# Patient Record
Sex: MALE | Race: WHITE | NOT HISPANIC OR LATINO | Employment: FULL TIME | URBAN - METROPOLITAN AREA
[De-identification: names, ages, dates, MRNs, and addresses within clinical notes are randomized per-mention and may not be internally consistent; named-entity substitution may affect disease eponyms.]

---

## 2024-01-19 ENCOUNTER — TELEPHONE (OUTPATIENT)
Dept: TRANSPLANT | Facility: CLINIC | Age: 64
End: 2024-01-19

## 2024-01-19 ENCOUNTER — DOCUMENTATION ONLY (OUTPATIENT)
Dept: TRANSPLANT | Facility: CLINIC | Age: 64
End: 2024-01-19
Payer: COMMERCIAL

## 2024-01-19 NOTE — TELEPHONE ENCOUNTER
"Voucher: Wants More Info Registered As: Standard Voucher Donor  Donor Intake Start: 23 Donor Intake Complete: 23  Gender: Male Preferred Language: English  Full Name: Angel Payne Is  Needed: [not answered]  E-mail: [email or web address] Phone Number: [phone]  Secondary Phone: [phone]  Contact Preference: [not answered] Best Contact Time: 9am - 5pm  Emergency Contact: Veena Elmer Emergency Contact #: [phone]  Relationship to Contact: Contact is my spouse  : [birth year] Age: 63  Address: [address] [place] [address] City: [place]  State: [place] Postal Code: [id number]  Height: 6'3\" Weight: 200lbs  BMI: 25  Employment Status: Employed Has PTO for donation? Yes, using vacation  Occupation: Inpector Requires Heavy Lifting? No  Education Level: High School Marital Status:   Exercise Routine: Occasional Health Insurance: Yes  Blood Type: O Ethnicity/Race: White  Donor Type: Standard Voucher Donor  Prefer Remote Donation: [not answered]  Physician: Dr. Bowling<br/>CRYS Jhaveri  Donating for Local Recipient  Recipient's Name: Shazia Corley Recipient's : [phone]  Recipient's Status: Patient not on dialysis but  needs a transplant soon.  How Candidate Knows Recip: Friend  Candidate communicates w/  Recip: Less Than Once A Month  Possible Interest In:  Motivation to donate: I know the recipient  Living Donor Pre-Screening  Is In [place]  Will accept blood transfusions? Yes  Has been diagnosed with kidney disease? No  Has had a heart attack? No  Has Diabetes (High BGs)? No  Has had cancer? No  Has had kidney stones? No  Has Used Tobacco? Yes   - currently uses tobacco: Yes   - will stop for surgery: Yes   - how many years: 43   - tobacco use (packs/cans) / frequency: 3 / Weekly  Has HIV? No  Is Currently Incarcerated? No  Is Currently Residing in [place] Yes  History Misc  Has Allergies? No  Has had Surgeries? Yes  Surgery When  Partial knee replacement   Partial knee replacement " 2012  Knee surgeries 3 times 2008  Takes Medication? Yes  Medication Dose Frequency  Ibuprofen 600 mg About once a week  Medical History  History of High BP? Never  History of CABG (bypass surgery)? No  History of blood clots? Never  History of coronary disease? Never  History of high cholesterol or triglycerides? Never  Has stents implanted? No  History of chest pain during exercise? No  History of chest pain at other times? No  Results of climbing 2 flights of stairs? No Problem  Had stress test in last year? No  Has had stroke? No  Has had leg bypass? No  History of lung disease? Never  History of COPD? Never  History of TB? Never  History of Pneumonia? Never  Has respiratory issues? No  Has HIV? No  Has Gastro Issues? No  History of Gallstones? Never  History of Pancreatitis? Never  History of Liver Disease? Never  History of Hepatitis B? Never  History of Hepatitis C? Never  History of bleeding problems? Never  History of UTIs? No  History of kidney damage? Never  History of Proteinuria? Never  History of Hematuria? Never  History of neuro disease? Never  History of seizure? Never  History of lupus? Never  History of paralysis? Never  History of arthritis? Treated in past  History of neuropathy? Never  History of depression? Never  History of anxiety? Never  History of documented psychiatric illness? Never  Has had transfusions? Unknown  History of Obesity? No  History of Fabry's Disease? No  History of Sickle Cell Disease? No  History of Sickle Cell Trait? No  History of Sarcoidosis? No  Has auto-immune disease? No  Has had Physical Exam? Yes   - how many years ago: 3  Has had PSA Test? No  Colonoscopy? Yes   - how many years ago: 5  Medical history comments? Both knees partial replacement  Living Donor Family Medical History  Anyone with kidney disease? No  Anyone with liver disease? No  Anyone with heart disease? Yes   - which family members: Mother and Father  Anyone with coronary artery disease? Yes   -  which family members: Mother and Father  Anyone with high blood pressure? Yes   - which family members: Mother  Anyone with blood disorder? No  Anyone with cancer? Yes   - which family members: Mother  Anyone with kidney cancer? No  Anyone with diabetes? No  Is mother alive? No  Mother's age? 68  Mother's cause of death? Lung cancer  Is father alive? No  Father's age? 58  Father's cause of death? Heart attack  How many siblings? 2  How many adult children? 2  How many children under 18? 0  Social History  Has Used Alcohol? Yes   - currently uses alcohol: Yes   - how much: 3/Weekly  Has Abused Alcohol? No  Has Used Drugs? Yes  Drugs Used Last Used Got Treatment?  Marijuana Current (less than 1yr) No  Has had legal issues w/ law enforcement? Yes  Has been sentenced to senior living? No  Traveled over 100 miles from home in last year? Yes   - Traveled Where? [place], [place], [place]  and all around [place]  Has had suicidal thoughts or attempts in the last five years? No

## 2024-01-23 ENCOUNTER — TELEPHONE (OUTPATIENT)
Dept: TRANSPLANT | Facility: CLINIC | Age: 64
End: 2024-01-23
Payer: COMMERCIAL

## 2024-01-23 NOTE — TELEPHONE ENCOUNTER
Initial Independent Living Donor Advocate contact made with potential donor today.  I introduced myself and my role during the donation process, including:   KATIE ROLE   The federal government requires that all licensed transplant centers provide the living donor with an Independent Living Donor Advocate (KATIE).  I do not meet recipients or attend meetings that discuss their care or decision to transplant them. My role is separate to avoid any conflict of interest.  My role is to ensure:  1) your rights are protected;  2) you get all the information you need from the transplant team to make a fully informed decision whether to donate;   3) that living donation is in your best interest.   4) that you have the right to decide NOT to go forward with living donation at any time during this process.  I am available to you throughout the workup, during surgery phase and follow-up at home.     WORKUP & PRIVACY   Your identity and workup are not shared with the recipient at any time.   The recipient's insurance covers the medical expenses related to the donor evaluation and surgery.  However, it is important that you carry your own health insurance to address any medical issues that are found and are NOT related to living donation.  Additionally, age appropriate cancer screening (I.e. mammograms,  colonoscopies, etc) is required and would be through your insurance.  There is a psychosocial and medical donor workup that consists of testing to determine if you are healthy enough to donate. Workup tests include tissue typing/genetics, many blood draws, urine collection/ (kidney function testing), chest x-ray, EKG/other heart testing, CT scan. Age appropriate cancer screening is required and would be through your insurance. As you complete each step then you may move on to the next.  Workup can take as little or as long as you need and you can stop the process at any time. Transplant is a treatment option, not a cure. A kidney  from a living kidney donor can last 12-14 years.  Other treatment options are  donation and two types of dialysis.   This is major surgery and your estimated hospital stay is approximately 1-2 nights.  After surgery, there are driving and lifting restrictions - no driving for two weeks and no lifting over ten pounds for 8 weeks.  Donors are routinely off from work for 4 - 6 weeks after surgery, and potentially longer if they have a physical job.     If you anticipate lost wages due to donation, donor wage reimbursement options may be available to you and will be reviewed with you during the evaluation process. Donor Shield and NLDAC explained.  We reviewed the importance of completing follow-up labs and surveys at six months, 1 year and 2 years after donation to monitor kidney health and the impact donation has had on their life post donation.        QUESTIONS    Have you received the Welcome e-mail that includes copies of the informed consent, financial letter, information on donor shield and NLDAC from the transplant department? Yes.    Have you discussed with anyone your potential decision to donate?   Yes. Spouse and work     Is anyone pressuring or coercing you to donate? No.    Have you discussed any financial arrangements with recipient around donating a kidney? No.    Are you aware that you can confidentially opt out at any time, up to and including day of donation? Yes.    At this time, would you like to proceed with the medical evaluation to see if you can be a kidney donor?  Yes.    If yes, I will make an appointment for your donor coordinator to reach out to you with next steps.     Contact information for KATIE's was provided Yes.    Shazia Madison- [phone]  Ingrid Law-  [phone]    Confirmed that he reviewed Informed consent document and all questions answered.  Reviewed that they will receive Docusign to obtain electronic signature for the following: Informed consent, SRTR data, YAJAIRA  "for medical information, Auth for Electronic communication, Kidney for Life and will need their signed consent back before proceeding with evaluation.     Time frame for donation: open  Paired exchange was introduced  Yes.      MyChart was initiated  Yes.  CareEverywhere was initiated No.    KATIE NOTES: Vimal wants to donate a kidney to a his friend, who was his boss at one time. Lives [place] with his wife. 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 Has health insurance through his wife. He wants our team to know that he has artificial knees and is wondering if that will impact imaging. He also mentioned tht his \"teeth are in bad shape\"-- he is in the process of getting dentures. He mentioned that his Dad had a heart transplant and wants to pay it forward to this friend. He does drink alcohol-- every other weekend, a few drinks at a time. Also sporadically drinks a THC seltzer. Vimal smokes cigarettes. About 8-10 per day. He states that from the ages of [age] before joining the [employer], he was using cocaine, speed, and weed. No other recent illicit drug use. Scheduled to speak to Willow Chawla 1/30 at 9am.     Ingrid Law, [facility], [facility]   Independent Living Donor Advocate  [facility] [place], [facility]  Direct: [phone]  E-Mail: [email or web address]        Duration of call 30 minutes   "

## 2024-01-30 ENCOUNTER — DOCUMENTATION ONLY (OUTPATIENT)
Dept: TRANSPLANT | Facility: CLINIC | Age: 64
End: 2024-01-30
Payer: COMMERCIAL

## 2024-01-30 ENCOUNTER — TELEPHONE (OUTPATIENT)
Dept: TRANSPLANT | Facility: CLINIC | Age: 64
End: 2024-01-30
Payer: COMMERCIAL

## 2024-01-30 DIAGNOSIS — Z00.5 TRANSPLANT DONOR EVALUATION: Primary | ICD-10-CM

## 2024-01-30 NOTE — TELEPHONE ENCOUNTER
Contacted Angel Payne to introduce myself and my role, review of medical/surgical/family history and next steps.     Angel Payne  is aware He can stop donor evaluation at any time.    Regular blood donor? No Last blood donation date  (Notified donor to avoid blood donation at this time to get accurate blood counts if going through evaluation)     Angel Payne is a 63 year old male  ABO O that would like to learn more about PEP. Open to paired exchange: PEP     Concerns from medical/surgical/family history: mom  from lung Ca.  Dad  post heart transplant    Current medications and NSAID use: Does use Advil but will change to Tylenol    Allergies reviewed: NKA    Legal issues w/ law enforcement: no    Reviewed any history of travel in endemic areas: [place], [place].  2019  [place]  Strongyloides- [place].  Trypanosoma cruzi ([place])- [place]  West Nile Virus- [place], [place], [place], [place] and [place]. (Included in ANDRÉS testing prior to donation)    Per our Phase 1 algorithm, does meet criteria to do preliminary testing. Social work screening no.      Verified that potential donor was provided the informed consent DocuSign and they are comfortable moving forward to living donor evaluation.    Reviewed evaluation testing: Iohexol, Lab work, CXR, EKG, Provider visits and functions, CT Angiogram.     Reviewed operations of selection committee and angio review meetings and the need for multidisciplinary input. Post-donation requirements include post-op appointment with your surgeon at 2 weeks after surgery, 6 week, 6 month, 1 year and 2 year lab tests.     Vimal Reviewed NKR listing and transfer of care to [facility] team if approved. Provided Eneida with NKR website to review.     I Briefly went over options if approved of NDD and voucher donation.     Vimal would like to proceed with next steps: phase 1     Encouraged sign up for RMDMgrouphart and reviewed  importance of watching teaching videos prior to evaluation.    I  Verified recipient status if not NDD.    Donor timeline: Has trip planned for on e week in Feb 2024.  [place] cruise July or August 2024.  Relatives wedding September 2024     Will send orders to scheduling team to set up for phase 1 testing.  Vimal will go to a [facility].

## 2024-02-02 ENCOUNTER — DOCUMENTATION ONLY (OUTPATIENT)
Dept: TRANSPLANT | Facility: CLINIC | Age: 64
End: 2024-02-02
Payer: COMMERCIAL

## 2024-02-02 NOTE — PROGRESS NOTES
"Received the following vitals from outside clinic on 2/2/24:BP's=128/88,112/88,122/88 Ht= 6' 3\" Mv=534#'s.  "

## 2024-02-04 ENCOUNTER — DOCUMENTATION ONLY (OUTPATIENT)
Dept: TRANSPLANT | Facility: CLINIC | Age: 64
End: 2024-02-04
Payer: COMMERCIAL

## 2024-02-20 ENCOUNTER — TELEPHONE (OUTPATIENT)
Dept: TRANSPLANT | Facility: CLINIC | Age: 64
End: 2024-02-20
Payer: COMMERCIAL

## 2024-02-20 DIAGNOSIS — Z00.5 TRANSPLANT DONOR EVALUATION: Primary | ICD-10-CM

## 2024-02-20 RX ORDER — ALBUTEROL SULFATE 0.83 MG/ML
2.5 SOLUTION RESPIRATORY (INHALATION)
Status: CANCELLED | OUTPATIENT
Start: 2024-03-15

## 2024-02-20 RX ORDER — MEPERIDINE HYDROCHLORIDE 25 MG/ML
25 INJECTION INTRAMUSCULAR; INTRAVENOUS; SUBCUTANEOUS EVERY 30 MIN PRN
Status: CANCELLED | OUTPATIENT
Start: 2024-03-15

## 2024-02-20 RX ORDER — EPINEPHRINE 1 MG/ML
0.3 INJECTION, SOLUTION, CONCENTRATE INTRAVENOUS EVERY 5 MIN PRN
Status: CANCELLED | OUTPATIENT
Start: 2024-03-15

## 2024-02-20 RX ORDER — DIPHENHYDRAMINE HYDROCHLORIDE 50 MG/ML
50 INJECTION INTRAMUSCULAR; INTRAVENOUS
Status: CANCELLED
Start: 2024-03-15

## 2024-02-20 RX ORDER — ALBUTEROL SULFATE 90 UG/1
1-2 AEROSOL, METERED RESPIRATORY (INHALATION)
Status: CANCELLED
Start: 2024-03-15

## 2024-02-20 NOTE — TELEPHONE ENCOUNTER
I called Angel to offer an evaluation day.  He chose Friday March 15.  He is scheduled for his colonoscopy the week before.  I reviewed expectations for eval day.  I encouraged him to sign up for [employer].  I will send the link.  I answered all questions.

## 2024-03-07 DIAGNOSIS — Z00.5 TRANSPLANT DONOR EVALUATION: Primary | ICD-10-CM

## 2024-03-10 ENCOUNTER — HEALTH MAINTENANCE LETTER (OUTPATIENT)
Age: 64
End: 2024-03-10

## 2024-03-14 LAB
ABO/RH(D): NORMAL
ANTIBODY SCREEN: NEGATIVE
SPECIMEN EXPIRATION DATE: NORMAL

## 2024-03-15 ENCOUNTER — ALLIED HEALTH/NURSE VISIT (OUTPATIENT)
Dept: TRANSPLANT | Facility: CLINIC | Age: 64
End: 2024-03-15
Attending: INTERNAL MEDICINE

## 2024-03-15 ENCOUNTER — OFFICE VISIT (OUTPATIENT)
Dept: TRANSPLANT | Facility: CLINIC | Age: 64
End: 2024-03-15
Attending: INTERNAL MEDICINE

## 2024-03-15 ENCOUNTER — LAB (OUTPATIENT)
Dept: LAB | Facility: CLINIC | Age: 64
End: 2024-03-15

## 2024-03-15 ENCOUNTER — LAB (OUTPATIENT)
Dept: LAB | Facility: CLINIC | Age: 64
End: 2024-03-15
Attending: INTERNAL MEDICINE

## 2024-03-15 ENCOUNTER — ANCILLARY PROCEDURE (OUTPATIENT)
Dept: GENERAL RADIOLOGY | Facility: CLINIC | Age: 64
End: 2024-03-15
Attending: INTERNAL MEDICINE
Payer: COMMERCIAL

## 2024-03-15 ENCOUNTER — ANCILLARY PROCEDURE (OUTPATIENT)
Dept: CT IMAGING | Facility: CLINIC | Age: 64
End: 2024-03-15
Attending: INTERNAL MEDICINE
Payer: COMMERCIAL

## 2024-03-15 ENCOUNTER — OFFICE VISIT (OUTPATIENT)
Dept: INFUSION THERAPY | Facility: CLINIC | Age: 64
End: 2024-03-15
Attending: INTERNAL MEDICINE

## 2024-03-15 VITALS
RESPIRATION RATE: 16 BRPM | HEART RATE: 67 BPM | HEIGHT: 75 IN | SYSTOLIC BLOOD PRESSURE: 146 MMHG | DIASTOLIC BLOOD PRESSURE: 93 MMHG | OXYGEN SATURATION: 99 % | BODY MASS INDEX: 24.94 KG/M2 | WEIGHT: 200.6 LBS

## 2024-03-15 VITALS
DIASTOLIC BLOOD PRESSURE: 93 MMHG | OXYGEN SATURATION: 98 % | SYSTOLIC BLOOD PRESSURE: 149 MMHG | HEART RATE: 67 BPM | RESPIRATION RATE: 16 BRPM | TEMPERATURE: 97.7 F | WEIGHT: 200.6 LBS

## 2024-03-15 DIAGNOSIS — Z00.5 TRANSPLANT DONOR EVALUATION: ICD-10-CM

## 2024-03-15 DIAGNOSIS — Z00.5 TRANSPLANT DONOR EVALUATION: Primary | ICD-10-CM

## 2024-03-15 LAB
ABO/RH(D): NORMAL
ALBUMIN MFR UR ELPH: 9.3 MG/DL
ALBUMIN SERPL BCG-MCNC: 4.6 G/DL (ref 3.5–5.2)
ALBUMIN UR-MCNC: NEGATIVE MG/DL
ALP SERPL-CCNC: 81 U/L (ref 40–150)
ALT SERPL W P-5'-P-CCNC: 61 U/L (ref 0–70)
ANION GAP SERPL CALCULATED.3IONS-SCNC: 10 MMOL/L (ref 7–15)
APPEARANCE UR: CLEAR
APTT PPP: 32 SECONDS (ref 22–38)
AST SERPL W P-5'-P-CCNC: 33 U/L (ref 0–45)
BILIRUB SERPL-MCNC: 0.6 MG/DL
BILIRUB UR QL STRIP: NEGATIVE
BUN SERPL-MCNC: 11.1 MG/DL (ref 8–23)
CALCIUM SERPL-MCNC: 9.2 MG/DL (ref 8.8–10.2)
CHLORIDE SERPL-SCNC: 104 MMOL/L (ref 98–107)
CHOLEST SERPL-MCNC: 135 MG/DL
CMV IGG SERPL IA-ACNC: <0.2 U/ML
CMV IGG SERPL IA-ACNC: NORMAL
COLOR UR AUTO: ABNORMAL
CREAT SERPL-MCNC: 0.95 MG/DL (ref 0.67–1.17)
CREAT UR-MCNC: 150 MG/DL
DEPRECATED HCO3 PLAS-SCNC: 22 MMOL/L (ref 22–29)
EBV VCA IGG SER IA-ACNC: 93.9 U/ML
EBV VCA IGG SER IA-ACNC: POSITIVE
EBV VCA IGM SER IA-ACNC: 11.3 U/ML
EBV VCA IGM SER IA-ACNC: NORMAL
EGFRCR SERPLBLD CKD-EPI 2021: >90 ML/MIN/1.73M2
ERYTHROCYTE [DISTWIDTH] IN BLOOD BY AUTOMATED COUNT: 12.1 % (ref 10–15)
FASTING STATUS PATIENT QL REPORTED: YES
GLUCOSE SERPL-MCNC: 108 MG/DL (ref 70–99)
GLUCOSE UR STRIP-MCNC: NEGATIVE MG/DL
HBA1C MFR BLD: 5.6 %
HCT VFR BLD AUTO: 44.2 % (ref 40–53)
HDLC SERPL-MCNC: 37 MG/DL
HGB BLD-MCNC: 16.2 G/DL (ref 13.3–17.7)
HGB UR QL STRIP: NEGATIVE
HYALINE CASTS: 2 /LPF
INR PPP: 1 (ref 0.85–1.15)
KETONES UR STRIP-MCNC: NEGATIVE MG/DL
LDLC SERPL CALC-MCNC: 81 MG/DL
LEUKOCYTE ESTERASE UR QL STRIP: NEGATIVE
MCH RBC QN AUTO: 30.9 PG (ref 26.5–33)
MCHC RBC AUTO-ENTMCNC: 36.7 G/DL (ref 31.5–36.5)
MCV RBC AUTO: 84 FL (ref 78–100)
MUCOUS THREADS #/AREA URNS LPF: PRESENT /LPF
NITRATE UR QL: NEGATIVE
NONHDLC SERPL-MCNC: 98 MG/DL
PH UR STRIP: 6 [PH] (ref 5–7)
PHOSPHATE SERPL-MCNC: 3 MG/DL (ref 2.5–4.5)
PLATELET # BLD AUTO: 219 10E3/UL (ref 150–450)
POTASSIUM SERPL-SCNC: 4.5 MMOL/L (ref 3.4–5.3)
PROT SERPL-MCNC: 7.2 G/DL (ref 6.4–8.3)
PROT/CREAT 24H UR: 0.06 MG/MG CR (ref 0–0.2)
PSA SERPL DL<=0.01 NG/ML-MCNC: 0.78 NG/ML (ref 0–4.5)
RBC # BLD AUTO: 5.24 10E6/UL (ref 4.4–5.9)
RBC URINE: <1 /HPF
SODIUM SERPL-SCNC: 136 MMOL/L (ref 135–145)
SP GR UR STRIP: 1.02 (ref 1–1.03)
SPECIMEN EXPIRATION DATE: NORMAL
T PALLIDUM AB SER QL: NONREACTIVE
TRIGL SERPL-MCNC: 86 MG/DL
URATE SERPL-MCNC: 5.2 MG/DL (ref 3.4–7)
UROBILINOGEN UR STRIP-MCNC: NORMAL MG/DL
WBC # BLD AUTO: 4.4 10E3/UL (ref 4–11)
WBC URINE: <1 /HPF

## 2024-03-15 PROCEDURE — 86788 WEST NILE VIRUS AB IGM: CPT

## 2024-03-15 PROCEDURE — 86665 EPSTEIN-BARR CAPSID VCA: CPT

## 2024-03-15 PROCEDURE — 36415 COLL VENOUS BLD VENIPUNCTURE: CPT

## 2024-03-15 PROCEDURE — 99214 OFFICE O/P EST MOD 30 MIN: CPT | Performed by: TRANSPLANT SURGERY

## 2024-03-15 PROCEDURE — 99205 OFFICE O/P NEW HI 60 MIN: CPT | Performed by: INTERNAL MEDICINE

## 2024-03-15 PROCEDURE — 81001 URINALYSIS AUTO W/SCOPE: CPT

## 2024-03-15 PROCEDURE — 85027 COMPLETE CBC AUTOMATED: CPT

## 2024-03-15 PROCEDURE — 80053 COMPREHEN METABOLIC PANEL: CPT

## 2024-03-15 PROCEDURE — 82542 COL CHROMOTOGRAPHY QUAL/QUAN: CPT

## 2024-03-15 PROCEDURE — 81378 HLA I & II TYPING HR: CPT

## 2024-03-15 PROCEDURE — 86900 BLOOD TYPING SEROLOGIC ABO: CPT

## 2024-03-15 PROCEDURE — 86780 TREPONEMA PALLIDUM: CPT

## 2024-03-15 PROCEDURE — 99207 PR NO CHARGE COORDINATED CARE PS: CPT

## 2024-03-15 PROCEDURE — 86706 HEP B SURFACE ANTIBODY: CPT

## 2024-03-15 PROCEDURE — 86850 RBC ANTIBODY SCREEN: CPT

## 2024-03-15 PROCEDURE — 99213 OFFICE O/P EST LOW 20 MIN: CPT | Performed by: TRANSPLANT SURGERY

## 2024-03-15 PROCEDURE — 86682 HELMINTH ANTIBODY: CPT

## 2024-03-15 PROCEDURE — 86704 HEP B CORE ANTIBODY TOTAL: CPT

## 2024-03-15 PROCEDURE — G0103 PSA SCREENING: HCPCS

## 2024-03-15 PROCEDURE — 255N000002 HC RX 255 OP 636: Performed by: INTERNAL MEDICINE

## 2024-03-15 PROCEDURE — 80061 LIPID PANEL: CPT

## 2024-03-15 PROCEDURE — 36415 COLL VENOUS BLD VENIPUNCTURE: CPT | Performed by: TRANSPLANT SURGERY

## 2024-03-15 PROCEDURE — 84550 ASSAY OF BLOOD/URIC ACID: CPT

## 2024-03-15 PROCEDURE — 86481 TB AG RESPONSE T-CELL SUSP: CPT

## 2024-03-15 PROCEDURE — 85610 PROTHROMBIN TIME: CPT

## 2024-03-15 PROCEDURE — 82570 ASSAY OF URINE CREATININE: CPT

## 2024-03-15 PROCEDURE — 83036 HEMOGLOBIN GLYCOSYLATED A1C: CPT

## 2024-03-15 PROCEDURE — 71046 X-RAY EXAM CHEST 2 VIEWS: CPT | Mod: GC | Performed by: RADIOLOGY

## 2024-03-15 PROCEDURE — 87340 HEPATITIS B SURFACE AG IA: CPT

## 2024-03-15 PROCEDURE — 84100 ASSAY OF PHOSPHORUS: CPT

## 2024-03-15 PROCEDURE — 86803 HEPATITIS C AB TEST: CPT

## 2024-03-15 PROCEDURE — 74175 CTA ABDOMEN W/CONTRAST: CPT | Performed by: RADIOLOGY

## 2024-03-15 PROCEDURE — 85730 THROMBOPLASTIN TIME PARTIAL: CPT

## 2024-03-15 PROCEDURE — 86644 CMV ANTIBODY: CPT

## 2024-03-15 PROCEDURE — 86753 PROTOZOA ANTIBODY NOS: CPT

## 2024-03-15 PROCEDURE — 84156 ASSAY OF PROTEIN URINE: CPT

## 2024-03-15 RX ORDER — ALBUTEROL SULFATE 90 UG/1
1-2 AEROSOL, METERED RESPIRATORY (INHALATION)
Status: CANCELLED
Start: 2024-03-15

## 2024-03-15 RX ORDER — METHYLPREDNISOLONE SODIUM SUCCINATE 125 MG/2ML
125 INJECTION, POWDER, LYOPHILIZED, FOR SOLUTION INTRAMUSCULAR; INTRAVENOUS
Status: CANCELLED
Start: 2024-03-15

## 2024-03-15 RX ORDER — MEPERIDINE HYDROCHLORIDE 25 MG/ML
25 INJECTION INTRAMUSCULAR; INTRAVENOUS; SUBCUTANEOUS EVERY 30 MIN PRN
Status: CANCELLED | OUTPATIENT
Start: 2024-03-15

## 2024-03-15 RX ORDER — ALBUTEROL SULFATE 0.83 MG/ML
2.5 SOLUTION RESPIRATORY (INHALATION)
Status: CANCELLED | OUTPATIENT
Start: 2024-03-15

## 2024-03-15 RX ORDER — IOPAMIDOL 755 MG/ML
90 INJECTION, SOLUTION INTRAVASCULAR ONCE
Status: COMPLETED | OUTPATIENT
Start: 2024-03-15 | End: 2024-03-15

## 2024-03-15 RX ORDER — EPINEPHRINE 1 MG/ML
0.3 INJECTION, SOLUTION INTRAMUSCULAR; SUBCUTANEOUS EVERY 5 MIN PRN
Status: CANCELLED | OUTPATIENT
Start: 2024-03-15

## 2024-03-15 RX ORDER — DIPHENHYDRAMINE HYDROCHLORIDE 50 MG/ML
50 INJECTION INTRAMUSCULAR; INTRAVENOUS
Status: CANCELLED
Start: 2024-03-15

## 2024-03-15 RX ADMIN — IOHEXOL 4 ML: 350 INJECTION, SOLUTION INTRAVENOUS at 07:14

## 2024-03-15 RX ADMIN — IOPAMIDOL 90 ML: 755 INJECTION, SOLUTION INTRAVASCULAR at 11:40

## 2024-03-15 ASSESSMENT — PAIN SCALES - GENERAL: PAINLEVEL: NO PAIN (0)

## 2024-03-15 NOTE — NURSING NOTE
"Chief Complaint   Patient presents with    Transplant Donor Evaluation     Transplant donor evaluation       BP 1: 147/93  BP 2: 132/91  BP 3: 160/94    BP (!) 146/93   Pulse 67   Resp 16   Ht 1.905 m (6' 3\")   Wt 91 kg (200 lb 9.6 oz)   SpO2 99%   BMI 25.07 kg/m      EUGENE SEGOVIA, RN on 3/15/2024 at 7:53 AM    "

## 2024-03-15 NOTE — PROGRESS NOTES
Donor Iohexol test    Angel Payne presents today to [facility] for a Donor Iohexol test.      Progress note:  ID verified by name and .     The following information was verified with the patient:  Female Patients is there any possibility of being pregnant NA  Is there a history of allergy (skin rash, swelling, ect) to:   A.  Iodine (except skin reactions to betadine): No   B. Intravenous radio-contrast agents: No   C. Seafood No     present during visit today: Not Applicable.    R.N. provided patient with educational handout regarding timed test. Yes    PIV placed in left AC and Iohexol administered over 2 minutes.  Positive blood return verified before and after injection. PIV removed.  20 gauge PIV placed in right AC by [facility] lab staff for blood draws and CT this afternoon.    Medication administered:  Iohexol (Omnipaque 300mg iodine/ml concentration) 5 mls.    Start time: 715  Stop time: 718    Administrations This Visit       iohexol (OMNIPAQUE) 350 MG/ML injectable solution 4 mL       Admin Date  03/15/2024 Action  $Given Dose  4 mL Route  Intravenous Documented By  Maria G Oneil RN                        Evaluation nurse in transplant to draw labs at 2 and 4 hours post iohexol administration.  Patient given a slip with the times to get labs drawn and verbalized understanding of the plan.    Patient tolerated the procedure:  Yes    After the infusion patient was discharged to the next appointment.    Maria G Oneil RN

## 2024-03-15 NOTE — DISCHARGE INSTRUCTIONS

## 2024-03-15 NOTE — LETTER
3/15/2024         RE: Angel Payne  [address]  [facility] [address]        Dear Colleague,    Thank you for referring your patient, Angel Payne, to the [facility]. Please see a copy of my visit note below.    TRANSPLANT NEPHROLOGY DONOR EVALUATION    Assessment and Plan:  # Prospective Kidney Transplant Donor: Patient with a lot of issues that need to be addressed prior to donation. Patient's blood pressure is elevated at this visit, kidney function appears to be acceptable with Iohexol pending, and urinalysis is bland.    # Elevated BP : Average BP-146/93, recommend 24h ABPM    # Cardiac risk: no exertional symptoms, however with multiple risk factors including age,smoking, possible HTN, prediabetes,+strong family hx of premature CAD in parents and brother,  Recommend stress echocardiogram    # Pre-diabetes: NyO0N-8.8% 2024, +family hx of DM in brother. BMI-25. Recommend lifestyle modifications    # Smokin pyrs.  +Family hx of lung cancer in mother at age 69. Recommend low dose CT chest for lung cancer screening    # Hx of NSAIDs: infrequent and he stopped recently, counseled about avoiding    # Dental health: multiple lost teeth, plan for dental implant, f/up dentist    # Cancer screening: colonoscopy 3/2024 (polyp removed per patient-obtain path report), PSA    Discussed the risks of donating a kidney, including the surgical risk and the possible risks of living with one kidney.    Education about expected post-donation kidney function and how chronic kidney disease (CKD) and end stage kidney disease (ESKD) might potentially impact the donor in the future, include, but not limited to:       - On average, donors will have 25-35% permanent loss of kidney function at donation.       - Baseline risk of ESKD may slightly exceed that of members of the general        population with the same demographic profile.       - Donor risks must be interpreted in light of  known epidemiology of both CKD or         ESKD, such as that CKD generally develops in midlife (40-50 years old) and ESKD         generally develops after age 60.       - Medical evaluation of young potential donors cannot predict lifetime risk of CKD or         ESKD.       - Donors may be at higher risk for CKD if they sustain damage to the remaining         kidney.       - Development of CKD and progression of ESKD may be more rapid with only 1         kidney.       - Some type of kidney replacement therapy, either kidney transplant or dialysis, is         required when reaching ESKD.    Potential medical or surgical risks include, but not limited to:       - Death.       - Scars, pain, fatigue, and other consequences typical of any surgical procedure.       - Decreased kidney function.       - Abdominal or bowel symptoms, such as bloating and nausea, and developing         bowel obstruction.       - Kidney failure (ESKD) and the need for a kidney transplant or dialysis for the donor.       - Impact of obesity, hypertension, or other donor-specific medical conditions on         morbidity and mortality of the potential donor.    Patients overall evaluation will be discussed with the transplant team and a final recommendation on the patients' suitability to be a kidney transplant donor will be made at that time.    Consult:  Angel Payne was seen in consultation at the request of Dr. Eric Hall for evaluation as a potential kidney transplant donor.    Reason for Visit:  Angel Payne is a 63 year old male who presents for a kidney donor evaluation.  Patient would like to donate to his previous boss .    Present Condition and Donor-Related Medical History:    Mr Payne is a 62 yo male with hx of smoking x 40 yr. He doesn't have regular care with a primary care physician and is unaware of any prior issues with HTN, DM, renal disease. He uses NSAIS prn headaches only rarely,. He just completed a screening  "colonoscopy last week and 1 polyp was removed, path unavailable, per patient he was advised to repeat in 10 yrs.         Kidney Disease Hx:       h/o Kidney Problems: No   Family h/o Genetic Kidney Disease: No       h/o Hypertension: Yes   mother    Usual Blood Pressure:  ~130s/80s       h/o Protein in Urine: No    h/o Blood in Urine: No       h/o Kidney Stones: No     h/o Kidney Injury: No       h/o Recurrent UTI: No   h/o Genitourinary Problems: No       h/o Chronic NSAID Use: Yes only 2x/week for headaches recently stopped         Other Medical Hx:       h/o Diabetes: No             h/o Gastrointestinal, Pancreas or Liver Problems: No       h/o Lung or Heart Problems: No       h/o Hematologic Problems: No  h/o Bleeding or Clotting Problems: No       h/o Cancer: No       h/o Infection Problems: No                Skin Cancer Risk:       h/o more than 50 moles: No       h/o extensive sun exposure: No       h/o melanoma: No       Family h/o melanoma: No         Mental Health Assessment:       h/o Depression: No       h/o Psychiatric Illness: No       h/o Suicidal Attempt(s): No    COVID Status:  Vaccination Up To Date: No, due for next dose  H/o COVID Infection: Yes     Review Of Systems:   A comprehensive review of systems was obtained and negative, except as noted in the HPI or PMH.    Past Medical History:   History was taken from the patient as noted below.  negative    Past Social History:   +smoking 40 yrs (1.2-1 pk/d), alcohol 2-3 x/wk, no illicit    Past surgical Hx:  Bilateral knee surgery -replacement  Hernia surgery at age 13    Personal or family history of anesthesia problems: No    Family History:   Neg renal disease       Specific Family History in First Degree Relatives:       FH of Kidney Dz: No  FH of Diabetes: Yes brother       FH of Hypertension: Yes mother FH of CAD: Yes both parents and brother:       \" CAD father at age 57 and mother at age 64, brother passed at age 52\".       FH of Cancer: Yes " lung Ca in mother  FH of Kidney Cancer: No    Personal History:   Social History     Socioeconomic History     Marital status:      Spouse name: Not on file     Number of children: Not on file     Years of education: Not on file     Highest education level: Not on file   Occupational History     Not on file   Tobacco Use     Smoking status: Every Day     Packs/day: .5     Types: Cigarettes     Smokeless tobacco: Never   Substance and Sexual Activity     Alcohol use: Yes     Comment: some weekends     Drug use: Yes     Comment: [employer] selzers     Sexual activity: Not on file   Other Topics Concern     Not on file   Social History Narrative     Not on file     Social Determinants of Health     Financial Resource Strain: Not on file   Food Insecurity: Not on file   Transportation Needs: Not on file   Physical Activity: Not on file   Stress: Not on file   Social Connections: Not on file   Interpersonal Safety: Not on file   Housing Stability: Not on file          Specific Social History:       Health Insurance Status: Yes       Employment Status: Full time  Occupation: machinest                       Living Arrangements: lives with their spouse       Social Support: Yes       Presence of increased risk for disease transmission behaviors as defined by [facility] guidelines: No        Allergies:  No Known Allergies    Medications:  No current outpatient medications on file.     No current facility-administered medications for this visit.     There are no discontinued medications.      Vitals:      3/15/2024     7:57 AM 3/15/2024     7:58 AM 3/15/2024     7:59 AM   Vital Signs   Systolic 132 160 146   Diastolic 91 94 93       Exam:   GENERAL APPEARANCE: alert and no distress  HENT: mouth without ulcers or lesions  RESP: lungs clear to auscultation - no rales, rhonchi or wheezes  CV: regular rhythm, normal rate, no rub, no murmur  EDEMA: no LE edema bilaterally  ABDOMEN: soft, nondistended, nontender, bowel sounds normal  MS:  extremities normal - no gross deformities noted, no evidence of inflammation in joints, no muscle tenderness  SKIN: no rash    Results:   Labs and imaging were ordered for this visit and reviewed by me.  Recent Results (from the past 336 hour(s))   Prostate spec antigen screen  for men over 50    Collection Time: 03/15/24  6:52 AM   Result Value Ref Range    Prostate Specific Antigen Screen 0.78 0.00 - 4.50 ng/mL   Protein  random urine    Collection Time: 03/15/24  6:52 AM   Result Value Ref Range    Total Protein Urine mg/dL 9.3   mg/dL    Total Protein Urine mg/mg Creat 0.06 0.00 - 0.20 mg/mg Cr    Creatinine Urine mg/dL 150.0 mg/dL   Routine UA with microscopic    Collection Time: 03/15/24  6:52 AM   Result Value Ref Range    Color Urine Light Yellow Colorless, Straw, Light Yellow, Yellow    Appearance Urine Clear Clear    Glucose Urine Negative Negative mg/dL    Bilirubin Urine Negative Negative    Ketones Urine Negative Negative mg/dL    Specific Gravity Urine 1.017 1.003 - 1.035    Blood Urine Negative Negative    pH Urine 6.0 5.0 - 7.0    Protein Albumin Urine Negative Negative mg/dL    Urobilinogen Urine Normal Normal, 2.0 mg/dL    Nitrite Urine Negative Negative    Leukocyte Esterase Urine Negative Negative    Mucus Urine Present (A) None Seen /LPF    RBC Urine <1 <=2 /HPF    WBC Urine <1 <=5 /HPF    Hyaline Casts Urine 2 <=2 /LPF   CBC with platelets    Collection Time: 03/15/24  6:52 AM   Result Value Ref Range    WBC Count 4.4 4.0 - 11.0 10e3/uL    RBC Count 5.24 4.40 - 5.90 10e6/uL    Hemoglobin 16.2 13.3 - 17.7 g/dL    Hematocrit 44.2 40.0 - 53.0 %    MCV 84 78 - 100 fL    MCH 30.9 26.5 - 33.0 pg    MCHC 36.7 (H) 31.5 - 36.5 g/dL    RDW 12.1 10.0 - 15.0 %    Platelet Count 219 150 - 450 10e3/uL   Partial thromboplastin time    Collection Time: 03/15/24  6:52 AM   Result Value Ref Range    aPTT 32 22 - 38 Seconds   INR    Collection Time: 03/15/24  6:52 AM   Result Value Ref Range    INR 1.00 0.85 -  1.15   Phosphorus    Collection Time: 03/15/24  6:52 AM   Result Value Ref Range    Phosphorus 3.0 2.5 - 4.5 mg/dL   Uric acid    Collection Time: 03/15/24  6:52 AM   Result Value Ref Range    Uric Acid 5.2 3.4 - 7.0 mg/dL   Lipid Profile    Collection Time: 03/15/24  6:52 AM   Result Value Ref Range    Cholesterol 135 <200 mg/dL    Triglycerides 86 <150 mg/dL    Direct Measure HDL 37 (L) >=40 mg/dL    LDL Cholesterol Calculated 81 <=100 mg/dL    Non HDL Cholesterol 98 <130 mg/dL    Patient Fasting > 8hrs? Yes    Comprehensive metabolic panel    Collection Time: 03/15/24  6:52 AM   Result Value Ref Range    Sodium 136 135 - 145 mmol/L    Potassium 4.5 3.4 - 5.3 mmol/L    Carbon Dioxide (CO2) 22 22 - 29 mmol/L    Anion Gap 10 7 - 15 mmol/L    Urea Nitrogen 11.1 8.0 - 23.0 mg/dL    Creatinine 0.95 0.67 - 1.17 mg/dL    GFR Estimate >90 >60 mL/min/1.73m2    Calcium 9.2 8.8 - 10.2 mg/dL    Chloride 104 98 - 107 mmol/L    Glucose 108 (H) 70 - 99 mg/dL    Alkaline Phosphatase 81 40 - 150 U/L    AST 33 0 - 45 U/L    ALT 61 0 - 70 U/L    Protein Total 7.2 6.4 - 8.3 g/dL    Albumin 4.6 3.5 - 5.2 g/dL    Bilirubin Total 0.6 <=1.2 mg/dL   Adult Type and Screen    Collection Time: 03/15/24  6:52 AM   Result Value Ref Range    ABO/RH(D) O POS     Antibody Screen Negative Negative    SPECIMEN EXPIRATION DATE [id number]    ABO and Rh 2nd type and screen required    Collection Time: 03/15/24  7:11 AM   Result Value Ref Range    ABO/RH(D) O POS     SPECIMEN EXPIRATION DATE [id number]            Again, thank you for allowing me to participate in the care of your patient.        Sincerely,        Enrrique Young MD

## 2024-03-15 NOTE — LETTER
3/15/2024         RE: Angel Payne  [address]  [place] [address]        Dear Colleague,    Thank you for referring your patient, Angel Payne, to the [facility]. Please see a copy of my visit note below.      Transplant Surgery Consult Note    Medical record number: [id number]  YOB: 1960,   Consult requested by the patient for evaluation of kidney donation candidacy.    Assessment and Recommendations: Mr. Payne appears to be a good candidate for kidney donation at this point in the evaluation. The following issues will need to be addressed prior to formal review:    CT abdomen and pelvis with contrast to be ordered for assessment vascular anatomy: Yes  Dietician consult ordered: Yes  Social work consult ordered: Yes  CXR and EKG ordered:  Yes  Transplant donor labs ordered to include HLA, ABOx2, Cr, Iohexol GFR or Cr clearance, virology etc.      Patient would like to donate to his boss The majority of our visit today was spent in counseling regarding the medical and surgical risks of kidney donation, the typical vianca-and post-operative experience and recovery/return to work pattern.  We also talked about post-op visits and longer term health care maintenance, as well as the implications of having one remaining kidney. This discussion included, but was not limited to rates of complications such as bleeding, infection, need for transfusion, reoperation, other organ injury, future bowel obstruction, incisional hernia, port site pain, varicocele, venous thrombosis, pulmonary embolism, renal failure, and death (3 per 10,000). The patient understands that if end stage renal failure happens that dialysis or transplant would be required.  At the conclusion of the visit, all questions had been answered and Mr. Payne's candidacy for donation will be reviewed at our Multidisciplinary Donor Selection Committee. He will stay in contact with the nurse  coordinator with any concerns.      As an aside, we have recently developed a risk calculator based on our long-term donor follow-up.  Our goal was to provide information to donors, donor candidates and the medical community. The calculator, available at the web site below (it loads slowly) provides a risk estimate over 40 years postdonation of developing diabetes, hypertension, proteinuria, reduced eGFR and ESRD.   We hope you find it useful,  (And please let us know if you have difficulties using it or have suggestions for improvement.)  [email or web address]    40 min spent on the date of the encounter in chart review, patient visit,  documentation and/or discussion with other providers about the issues documented above.        Eric Hall MD  Surgical Professor, Kidney Transplantation                                                                                                    ---------------------------------------------------------------------------------------------------    HPI: Angel Payne is a 63 year old year old male who presents for a kidney donor evaluation.        Personal history of:   No    Yes  Cancer:    []      []  Comment:     Diabetes   []      []  Comment:    Thomkayleeis   []      []  Comment:       Hepatitis   []      []  Comment:    Tuberculosis   []      []  Comment:   Back or neck pain:  []      []  Comment:      Kidney stones   []      []  Comment:                  Kidney infections  []      []  Comment:           Urinary retention  []      []    Comment:   Regular NSAID use:  []      []     Comment:      Constipation:   []      []        Comment:      Oriental orthodox  []      []       Comment:      Other:    []      []       Comment:         No past medical history on file.  No past surgical history on file.  No family history on file.  Social History     Socioeconomic History     Marital status:      Spouse name: Not on file     Number  of children: Not on file     Years of education: Not on file     Highest education level: Not on file   Occupational History     Not on file   Tobacco Use     Smoking status: Every Day     Packs/day: .5     Types: Cigarettes     Smokeless tobacco: Never   Substance and Sexual Activity     Alcohol use: Yes     Comment: some weekends     Drug use: Yes     Comment: THC selzers     Sexual activity: Not on file   Other Topics Concern     Not on file   Social History Narrative     Not on file     Social Determinants of Health     Financial Resource Strain: Not on file   Food Insecurity: Not on file   Transportation Needs: Not on file   Physical Activity: Not on file   Stress: Not on file   Social Connections: Not on file   Interpersonal Safety: Not on file   Housing Stability: Not on file       ROS:   CONSTITUTIONAL:  No fevers or chills  EYES: negative for icterus  ENT:  negative for hearing loss, tinnitus and sore throat  RESPIRATORY:  negative for cough, sputum, dyspnea  CARDIOVASCULAR:  negative for chest pain  GASTROINTESTINAL:  negative for nausea, vomiting, diarrhea or constipation  GENITOURINARY:  negative for incontinence, dysuria, bladder emptying problems  HEME:  No easy bruising  INTEGUMENT:  negative for rash and pruritus  NEURO:  Negative for headache, seizure disorder    Allergies:   No Known Allergies    Medications:      Exam:      Body mass index is 25.07 kg/m .  Constitutional - A&O in NAD.   Eyes - no redness or discharge.  Sclera anicteric  Respiratory - no cough, no labored breathing  Musculoskeletal - range of motion normal  Skin - no discoloration, no jaundice  Neurological - no tremors.  No facial droop or dysarthria  Psychiatric - normal mood and affect  The rest of a comprehensive physical examination is deferred due to PHE (public health emergency) video visit restrictions     Diagnostics:   Recent Results (from the past 336 hour(s))   Prostate spec antigen screen  for men over 50    Collection  Time: 03/15/24  6:52 AM   Result Value Ref Range    Prostate Specific Antigen Screen 0.78 0.00 - 4.50 ng/mL   EBV Capsid Antibody IgM    Collection Time: 03/15/24  6:52 AM   Result Value Ref Range    EBV Capsid Shireen IgM Instrument Value 11.3 <36.0 U/mL    EBV Capsid Antibody IgM No detectable antibody. No detectable antibody.   EBV Capsid Antibody IgG    Collection Time: 03/15/24  6:52 AM   Result Value Ref Range    EBV Capsid Shireen IgG Instrument Value 93.9 (H) <18.0 U/mL    EBV Capsid Antibody IgG Positive (A) No detectable antibody.   CMV Antibody IgG    Collection Time: 03/15/24  6:52 AM   Result Value Ref Range    CMV Shireen IgG Instrument Value <0.20 <0.60 U/mL    CMV Antibody IgG No detectable antibody. No detectable antibody.    Treponema Abs w Reflex to RPR and Titer    Collection Time: 03/15/24  6:52 AM   Result Value Ref Range    Treponema Antibody Total Nonreactive Nonreactive   HIV Antigen Antibody Combo Pretransplant Cascade    Collection Time: 03/15/24  6:52 AM   Result Value Ref Range    HIV Antigen Antibody Combo Pretransplant Nonreactive Nonreactive   Hepatitis C antibody    Collection Time: 03/15/24  6:52 AM   Result Value Ref Range    Hepatitis C Antibody Nonreactive Nonreactive   Hepatitis B surface antigen    Collection Time: 03/15/24  6:52 AM   Result Value Ref Range    Hepatitis B Surface Antigen Nonreactive Nonreactive   Hepatitis B Surface Antibody    Collection Time: 03/15/24  6:52 AM   Result Value Ref Range    Hepatitis B Surface Antibody Nonreactive     Hepatitis B Surface Antibody Instrument Value <3.50 <8.5 m[IU]/mL   Hepatitis B core antibody    Collection Time: 03/15/24  6:52 AM   Result Value Ref Range    Hepatitis B Core Antibody Total Nonreactive Nonreactive   Protein  random urine    Collection Time: 03/15/24  6:52 AM   Result Value Ref Range    Total Protein Urine mg/dL 9.3   mg/dL    Total Protein Urine mg/mg Creat 0.06 0.00 - 0.20 mg/mg Cr    Creatinine Urine mg/dL 150.0 mg/dL    Albumin Random Urine Quantitative with Creat Ratio    Collection Time: 03/15/24  6:52 AM   Result Value Ref Range    Creatinine Urine mg/dL 151.0 mg/dL    Albumin Urine mg/L <12.0 mg/L    Albumin Urine mg/g Cr     Routine UA with microscopic    Collection Time: 03/15/24  6:52 AM   Result Value Ref Range    Color Urine Light Yellow Colorless, Straw, Light Yellow, Yellow    Appearance Urine Clear Clear    Glucose Urine Negative Negative mg/dL    Bilirubin Urine Negative Negative    Ketones Urine Negative Negative mg/dL    Specific Gravity Urine 1.017 1.003 - 1.035    Blood Urine Negative Negative    pH Urine 6.0 5.0 - 7.0    Protein Albumin Urine Negative Negative mg/dL    Urobilinogen Urine Normal Normal, 2.0 mg/dL    Nitrite Urine Negative Negative    Leukocyte Esterase Urine Negative Negative    Mucus Urine Present (A) None Seen /LPF    RBC Urine <1 <=2 /HPF    WBC Urine <1 <=5 /HPF    Hyaline Casts Urine 2 <=2 /LPF   CBC with platelets    Collection Time: 03/15/24  6:52 AM   Result Value Ref Range    WBC Count 4.4 4.0 - 11.0 10e3/uL    RBC Count 5.24 4.40 - 5.90 10e6/uL    Hemoglobin 16.2 13.3 - 17.7 g/dL    Hematocrit 44.2 40.0 - 53.0 %    MCV 84 78 - 100 fL    MCH 30.9 26.5 - 33.0 pg    MCHC 36.7 (H) 31.5 - 36.5 g/dL    RDW 12.1 10.0 - 15.0 %    Platelet Count 219 150 - 450 10e3/uL   Partial thromboplastin time    Collection Time: 03/15/24  6:52 AM   Result Value Ref Range    aPTT 32 22 - 38 Seconds   INR    Collection Time: 03/15/24  6:52 AM   Result Value Ref Range    INR 1.00 0.85 - 1.15   Hemoglobin A1c    Collection Time: 03/15/24  6:52 AM   Result Value Ref Range    Hemoglobin A1C 5.6 <5.7 %   Phosphorus    Collection Time: 03/15/24  6:52 AM   Result Value Ref Range    Phosphorus 3.0 2.5 - 4.5 mg/dL   Uric acid    Collection Time: 03/15/24  6:52 AM   Result Value Ref Range    Uric Acid 5.2 3.4 - 7.0 mg/dL   Lipid Profile    Collection Time: 03/15/24  6:52 AM   Result Value Ref Range    Cholesterol 135  <200 mg/dL    Triglycerides 86 <150 mg/dL    Direct Measure HDL 37 (L) >=40 mg/dL    LDL Cholesterol Calculated 81 <=100 mg/dL    Non HDL Cholesterol 98 <130 mg/dL    Patient Fasting > 8hrs? Yes    Comprehensive metabolic panel    Collection Time: 03/15/24  6:52 AM   Result Value Ref Range    Sodium 136 135 - 145 mmol/L    Potassium 4.5 3.4 - 5.3 mmol/L    Carbon Dioxide (CO2) 22 22 - 29 mmol/L    Anion Gap 10 7 - 15 mmol/L    Urea Nitrogen 11.1 8.0 - 23.0 mg/dL    Creatinine 0.95 0.67 - 1.17 mg/dL    GFR Estimate >90 >60 mL/min/1.73m2    Calcium 9.2 8.8 - 10.2 mg/dL    Chloride 104 98 - 107 mmol/L    Glucose 108 (H) 70 - 99 mg/dL    Alkaline Phosphatase 81 40 - 150 U/L    AST 33 0 - 45 U/L    ALT 61 0 - 70 U/L    Protein Total 7.2 6.4 - 8.3 g/dL    Albumin 4.6 3.5 - 5.2 g/dL    Bilirubin Total 0.6 <=1.2 mg/dL   Quantiferon TB Gold Plus Grey Tube    Collection Time: 03/15/24  6:52 AM    Specimen: Arm, Right; Blood   Result Value Ref Range    Quantiferon Nil Tube 0.01 IU/mL   Quantiferon TB Gold Plus Green Tube    Collection Time: 03/15/24  6:52 AM    Specimen: Arm, Right; Blood   Result Value Ref Range    Quantiferon TB1 Tube 0.01 IU/mL   Quantiferon TB Gold Plus Yellow Tube    Collection Time: 03/15/24  6:52 AM    Specimen: Arm, Right; Blood   Result Value Ref Range    Quantiferon TB2 Tube 0.01    Quantiferon TB Gold Plus Purple Tube    Collection Time: 03/15/24  6:52 AM    Specimen: Arm, Right; Blood   Result Value Ref Range    Quantiferon Mitogen 10.00 IU/mL   Adult Type and Screen    Collection Time: 03/15/24  6:52 AM   Result Value Ref Range    ABO/RH(D) O POS     Antibody Screen Negative Negative    SPECIMEN EXPIRATION DATE [id number]    Quantiferon TB Gold Plus    Collection Time: 03/15/24  6:52 AM    Specimen: Arm, Right; Blood   Result Value Ref Range    Quantiferon-TB Gold Plus Negative Negative    TB1 Ag minus Nil Value 0.00 IU/mL    TB2 Ag minus Nil Value 0.00 IU/mL    Mitogen minus Nil Result 9.99  IU/mL    Nil Result 0.01 IU/mL   ABO and Rh 2nd type and screen required    Collection Time: 03/15/24  7:11 AM   Result Value Ref Range    ABO/RH(D) O POS     SPECIMEN EXPIRATION DATE [id number]    EKG 12-lead complete w/read - Clinics    Collection Time: 03/15/24 12:22 PM   Result Value Ref Range    Systolic Blood Pressure  mmHg    Diastolic Blood Pressure  mmHg    Ventricular Rate 61 BPM    Atrial Rate 61 BPM    UT Interval 198 ms    QRS Duration 86 ms     ms    QTc 388 ms    P Axis 64 degrees    R AXIS 21 degrees    T Axis 38 degrees    Interpretation ECG       Sinus rhythm  Normal ECG  No previous ECGs available           Transplant Surgery Consult Note    Medical record number: [id number]  YOB: 1960,   Consult requested by the patient for evaluation of kidney donation candidacy.    Assessment and Recommendations: Mr. Payne appears to be a good candidate for kidney donation at this point in the evaluation. The following issues will need to be addressed prior to formal review:    CT abdomen and pelvis with contrast to be ordered for assessment vascular anatomy: Yes  Dietician consult ordered: Yes  Social work consult ordered: Yes  CXR and EKG ordered:  Yes  Transplant donor labs ordered to include HLA, ABOx2, Cr, Iohexol GFR or Cr clearance, virology etc.      Patient would like to donate to his former boss  The majority of our visit today was spent in counseling regarding the medical and surgical risks of kidney donation, the typical vianca-and post-operative experience and recovery/return to work pattern.  We also talked about post-op visits and longer term health care maintenance, as well as the implications of having one remaining kidney. This discussion included, but was not limited to rates of complications such as bleeding, infection, need for transfusion, reoperation, other organ injury, future bowel obstruction, incisional hernia, port site pain, varicocele, venous thrombosis,  pulmonary embolism, renal failure, and death (3 per 10,000). The patient understands that if end stage renal failure happens that dialysis or transplant would be required.  At the conclusion of the visit, all questions had been answered and Mr. Payne's candidacy for donation will be reviewed at our Multidisciplinary Donor Selection Committee. He will stay in contact with the nurse coordinator with any concerns.             We discussed long-term risks in detail.  I discussed the articles suggesting a small increase in ESKD in donors and their limitations.        We discussed recovery, including length of hospital stay; no new meds other than pain meds on discharge; limitations after surgery (no driving for a couple of weeks; no lifting over 10 lbs or exercise stretching abdominal muscles for 6 weeks; return to work [he lifts as part of his job]; and fatigue for the first few weeks postdonation.  I informed him of our donor survey results on donors feeling ready to drive, and on return to feeling back to normal.  We also discussed the need for maintaining a healthy lifestyle long-term after donation  We discussed the national Resultly system and the possibility of participating, if not a match for the intended recipient.  I explained how the system worked.    As an aside, we have recently developed a risk calculator based on our long-term donor follow-up.  Our goal was to provide information to donors, donor candidates and the medical community. The calculator, available at the web site below (it loads slowly) provides a risk estimate over 40 years postdonation of developing diabetes, hypertension, proteinuria, reduced eGFR and ESRD.   We hope you find it useful,  (And please let us know if you have difficulties using it or have suggestions for improvement.)  [email or web address]    40 min spent on the date of the encounter in chart review, patient visit,  documentation and/or  discussion with other providers about the issues documented above.        Eric Hall MD  Surgical Professor, Kidney Transplantation                                                                                                    ---------------------------------------------------------------------------------------------------    HPI: Angel Payne is a 63 year old year old male who presents for a kidney donor evaluation.        Personal history of:   No    Yes  Cancer:    []      []  Comment:     Diabetes   []      []  Comment:    Thomkayleeis   []      []  Comment:       Hepatitis   []      []  Comment:    Tuberculosis   []      []  Comment:   Back or neck pain:  []      []  Comment:      Kidney stones   []      []  Comment:                  Kidney infections  []      []  Comment:           Urinary retention  []      []    Comment:   Regular NSAID use:  []      []     Comment:      Constipation:   []      []        Comment:      Latter-day  []      []       Comment:      Other:    []      []       Comment:         No past medical history on file.  No past surgical history on file.  No family history on file.  Social History     Socioeconomic History     Marital status:      Spouse name: Not on file     Number of children: Not on file     Years of education: Not on file     Highest education level: Not on file   Occupational History     Not on file   Tobacco Use     Smoking status: Every Day     Packs/day: .5     Types: Cigarettes     Smokeless tobacco: Never   Substance and Sexual Activity     Alcohol use: Yes     Comment: some weekends     Drug use: Yes     Comment: THC selzers     Sexual activity: Not on file   Other Topics Concern     Not on file   Social History Narrative     Not on file     Social Determinants of Health     Financial Resource Strain: Not on file   Food Insecurity: Not on file   Transportation Needs: Not on file   Physical Activity: Not on file   Stress: Not on file   Social  Connections: Not on file   Interpersonal Safety: Not on file   Housing Stability: Not on file       ROS:   CONSTITUTIONAL:  No fevers or chills  EYES: negative for icterus  ENT:  negative for hearing loss, tinnitus and sore throat  RESPIRATORY:  negative for cough, sputum, dyspnea  CARDIOVASCULAR:  negative for chest pain  GASTROINTESTINAL:  negative for nausea, vomiting, diarrhea or constipation  GENITOURINARY:  negative for incontinence, dysuria, bladder emptying problems  HEME:  No easy bruising  INTEGUMENT:  negative for rash and pruritus  NEURO:  Negative for headache, seizure disorder    Allergies:   No Known Allergies    Medications:      Exam:      Body mass index is 25.07 kg/m .  Constitutional - A&O in NAD.   Eyes - no redness or discharge.  Sclera anicteric  Respiratory - no cough, no labored breathing  Musculoskeletal - range of motion normal  Skin - no discoloration, no jaundice  Neurological - no tremors.  No facial droop or dysarthria  Psychiatric - normal mood and affect  The rest of a comprehensive physical examination is deferred due to PHE (public health emergency) video visit restrictions     Diagnostics:   Recent Results (from the past 336 hour(s))   Prostate spec antigen screen  for men over 50    Collection Time: 03/15/24  6:52 AM   Result Value Ref Range    Prostate Specific Antigen Screen 0.78 0.00 - 4.50 ng/mL   EBV Capsid Antibody IgM    Collection Time: 03/15/24  6:52 AM   Result Value Ref Range    EBV Capsid Shireen IgM Instrument Value 11.3 <36.0 U/mL    EBV Capsid Antibody IgM No detectable antibody. No detectable antibody.   EBV Capsid Antibody IgG    Collection Time: 03/15/24  6:52 AM   Result Value Ref Range    EBV Capsid Shireen IgG Instrument Value 93.9 (H) <18.0 U/mL    EBV Capsid Antibody IgG Positive (A) No detectable antibody.   CMV Antibody IgG    Collection Time: 03/15/24  6:52 AM   Result Value Ref Range    CMV Shireen IgG Instrument Value <0.20 <0.60 U/mL    CMV Antibody IgG No  detectable antibody. No detectable antibody.    Treponema Abs w Reflex to RPR and Titer    Collection Time: 03/15/24  6:52 AM   Result Value Ref Range    Treponema Antibody Total Nonreactive Nonreactive   HIV Antigen Antibody Combo Pretransplant Cascade    Collection Time: 03/15/24  6:52 AM   Result Value Ref Range    HIV Antigen Antibody Combo Pretransplant Nonreactive Nonreactive   Hepatitis C antibody    Collection Time: 03/15/24  6:52 AM   Result Value Ref Range    Hepatitis C Antibody Nonreactive Nonreactive   Hepatitis B surface antigen    Collection Time: 03/15/24  6:52 AM   Result Value Ref Range    Hepatitis B Surface Antigen Nonreactive Nonreactive   Hepatitis B Surface Antibody    Collection Time: 03/15/24  6:52 AM   Result Value Ref Range    Hepatitis B Surface Antibody Nonreactive     Hepatitis B Surface Antibody Instrument Value <3.50 <8.5 m[IU]/mL   Hepatitis B core antibody    Collection Time: 03/15/24  6:52 AM   Result Value Ref Range    Hepatitis B Core Antibody Total Nonreactive Nonreactive   Protein  random urine    Collection Time: 03/15/24  6:52 AM   Result Value Ref Range    Total Protein Urine mg/dL 9.3   mg/dL    Total Protein Urine mg/mg Creat 0.06 0.00 - 0.20 mg/mg Cr    Creatinine Urine mg/dL 150.0 mg/dL   Albumin Random Urine Quantitative with Creat Ratio    Collection Time: 03/15/24  6:52 AM   Result Value Ref Range    Creatinine Urine mg/dL 151.0 mg/dL    Albumin Urine mg/L <12.0 mg/L    Albumin Urine mg/g Cr     Routine UA with microscopic    Collection Time: 03/15/24  6:52 AM   Result Value Ref Range    Color Urine Light Yellow Colorless, Straw, Light Yellow, Yellow    Appearance Urine Clear Clear    Glucose Urine Negative Negative mg/dL    Bilirubin Urine Negative Negative    Ketones Urine Negative Negative mg/dL    Specific Gravity Urine 1.017 1.003 - 1.035    Blood Urine Negative Negative    pH Urine 6.0 5.0 - 7.0    Protein Albumin Urine Negative Negative mg/dL    Urobilinogen  Urine Normal Normal, 2.0 mg/dL    Nitrite Urine Negative Negative    Leukocyte Esterase Urine Negative Negative    Mucus Urine Present (A) None Seen /LPF    RBC Urine <1 <=2 /HPF    WBC Urine <1 <=5 /HPF    Hyaline Casts Urine 2 <=2 /LPF   CBC with platelets    Collection Time: 03/15/24  6:52 AM   Result Value Ref Range    WBC Count 4.4 4.0 - 11.0 10e3/uL    RBC Count 5.24 4.40 - 5.90 10e6/uL    Hemoglobin 16.2 13.3 - 17.7 g/dL    Hematocrit 44.2 40.0 - 53.0 %    MCV 84 78 - 100 fL    MCH 30.9 26.5 - 33.0 pg    MCHC 36.7 (H) 31.5 - 36.5 g/dL    RDW 12.1 10.0 - 15.0 %    Platelet Count 219 150 - 450 10e3/uL   Partial thromboplastin time    Collection Time: 03/15/24  6:52 AM   Result Value Ref Range    aPTT 32 22 - 38 Seconds   INR    Collection Time: 03/15/24  6:52 AM   Result Value Ref Range    INR 1.00 0.85 - 1.15   Hemoglobin A1c    Collection Time: 03/15/24  6:52 AM   Result Value Ref Range    Hemoglobin A1C 5.6 <5.7 %   Phosphorus    Collection Time: 03/15/24  6:52 AM   Result Value Ref Range    Phosphorus 3.0 2.5 - 4.5 mg/dL   Uric acid    Collection Time: 03/15/24  6:52 AM   Result Value Ref Range    Uric Acid 5.2 3.4 - 7.0 mg/dL   Lipid Profile    Collection Time: 03/15/24  6:52 AM   Result Value Ref Range    Cholesterol 135 <200 mg/dL    Triglycerides 86 <150 mg/dL    Direct Measure HDL 37 (L) >=40 mg/dL    LDL Cholesterol Calculated 81 <=100 mg/dL    Non HDL Cholesterol 98 <130 mg/dL    Patient Fasting > 8hrs? Yes    Comprehensive metabolic panel    Collection Time: 03/15/24  6:52 AM   Result Value Ref Range    Sodium 136 135 - 145 mmol/L    Potassium 4.5 3.4 - 5.3 mmol/L    Carbon Dioxide (CO2) 22 22 - 29 mmol/L    Anion Gap 10 7 - 15 mmol/L    Urea Nitrogen 11.1 8.0 - 23.0 mg/dL    Creatinine 0.95 0.67 - 1.17 mg/dL    GFR Estimate >90 >60 mL/min/1.73m2    Calcium 9.2 8.8 - 10.2 mg/dL    Chloride 104 98 - 107 mmol/L    Glucose 108 (H) 70 - 99 mg/dL    Alkaline Phosphatase 81 40 - 150 U/L    AST 33 0 - 45  U/L    ALT 61 0 - 70 U/L    Protein Total 7.2 6.4 - 8.3 g/dL    Albumin 4.6 3.5 - 5.2 g/dL    Bilirubin Total 0.6 <=1.2 mg/dL   Quantiferon TB Gold Plus Grey Tube    Collection Time: 03/15/24  6:52 AM    Specimen: Arm, Right; Blood   Result Value Ref Range    Quantiferon Nil Tube 0.01 IU/mL   Quantiferon TB Gold Plus Green Tube    Collection Time: 03/15/24  6:52 AM    Specimen: Arm, Right; Blood   Result Value Ref Range    Quantiferon TB1 Tube 0.01 IU/mL   Quantiferon TB Gold Plus Yellow Tube    Collection Time: 03/15/24  6:52 AM    Specimen: Arm, Right; Blood   Result Value Ref Range    Quantiferon TB2 Tube 0.01    Quantiferon TB Gold Plus Purple Tube    Collection Time: 03/15/24  6:52 AM    Specimen: Arm, Right; Blood   Result Value Ref Range    Quantiferon Mitogen 10.00 IU/mL   Adult Type and Screen    Collection Time: 03/15/24  6:52 AM   Result Value Ref Range    ABO/RH(D) O POS     Antibody Screen Negative Negative    SPECIMEN EXPIRATION DATE [id number]    Quantiferon TB Gold Plus    Collection Time: 03/15/24  6:52 AM    Specimen: Arm, Right; Blood   Result Value Ref Range    Quantiferon-TB Gold Plus Negative Negative    TB1 Ag minus Nil Value 0.00 IU/mL    TB2 Ag minus Nil Value 0.00 IU/mL    Mitogen minus Nil Result 9.99 IU/mL    Nil Result 0.01 IU/mL   ABO and Rh 2nd type and screen required    Collection Time: 03/15/24  7:11 AM   Result Value Ref Range    ABO/RH(D) O POS     SPECIMEN EXPIRATION DATE [id number]    EKG 12-lead complete w/read - Clinics    Collection Time: 03/15/24 12:22 PM   Result Value Ref Range    Systolic Blood Pressure  mmHg    Diastolic Blood Pressure  mmHg    Ventricular Rate 61 BPM    Atrial Rate 61 BPM    [place] Interval 198 ms    QRS Duration 86 ms     ms    QTc 388 ms    P Axis 64 degrees    R AXIS 21 degrees    T Axis 38 degrees    Interpretation ECG       Sinus rhythm  Normal ECG  No previous ECGs available         Transplant Surgery Consult Note    Medical record  number: [id number]  YOB: 1960,   Consult requested by the patient for evaluation of kidney donation candidacy.    Assessment and Recommendations: Mr. Payne appears to be a good candidate for kidney donation at this point in the evaluation. The following issues will need to be addressed prior to formal review:    CT abdomen and pelvis with contrast to be ordered for assessment vascular anatomy: Yes  Dietician consult ordered: Yes  Social work consult ordered: Yes  CXR and EKG ordered:  Yes  Transplant donor labs ordered to include HLA, ABOx2, Cr, Iohexol GFR or Cr clearance, virology etc.      Mr Payne was at clinic with is wife.  He would like to donate to his former boss. The majority of our visit today was spent in counseling regarding the medical and surgical risks of kidney donation, the typical vianca-and post-operative experience and recovery/return to work pattern.  We also talked about post-op visits and longer term health care maintenance, as well as the implications of having one remaining kidney. This discussion included, but was not limited to rates of complications such as bleeding, infection, need for transfusion, reoperation, other organ injury, future bowel obstruction, incisional hernia, port site pain, varicocele, venous thrombosis, pulmonary embolism, renal failure, and death (3 per 10,000). The patient understands that if end stage renal failure happens that dialysis or transplant would be required.     We discussed long-term risks in detail.  I discussed the articles suggesting a small increase in ESKD in donors and their limitations.        We discussed recovery, including length of hospital stay; no new meds other than pain meds on discharge; limitations after surgery (no driving for a couple of weeks; no lifting over 10 lbs or exercise stretching abdominal muscles for 6 weeks; return to work [he lifts as part of his job]; and fatigue for the first few weeks postdonation.  I  informed him/ of our donor survey results on donors feeling ready to drive, and on return to feeling back to normal.  We also discussed the need for maintaining a healthy lifestyle long-term after donation    We discussed the national [employer] system and the possibility of participating, if not a match for the intended recipient.  I explained how the system worked.   At the conclusion of the visit, all questions had been answered and Mr. Payne's candidacy for donation will be reviewed at our Multidisciplinary Donor Selection Committee. He will stay in contact with the nurse coordinator with any concerns.      As an aside, we have recently developed a risk calculator based on our long-term donor follow-up.  Our goal was to provide information to donors, donor candidates and the medical community. The calculator, available at the web site below (it loads slowly) provides a risk estimate over 40 years postdonation of developing diabetes, hypertension, proteinuria, reduced eGFR and ESRD.   We hope you find it useful,  (And please let us know if you have difficulties using it or have suggestions for improvement.)  [email or web address]    40 min spent on the date of the encounter in chart review, patient visit,  documentation and/or discussion with other providers about the issues documented above.        Eric Hall MD  Surgical Professor, Kidney Transplantation                                                                                                    ---------------------------------------------------------------------------------------------------    HPI: Angel Payne is a 63 year old year old male who presents for a kidney donor evaluation.        Personal history of:   No    Yes  Cancer:    []      []  Comment:     Diabetes   []      []  Comment:    Rj   []      []  Comment:       Hepatitis   []      []  Comment:    Tuberculosis   []      []  Comment:   Back or  neck pain:  []      []  Comment:      Kidney stones   []      []  Comment:                  Kidney infections  []      []  Comment:           Urinary retention  []      []    Comment:   Regular NSAID use:  []      []     Comment:      Constipation:   []      []        Comment:      Sikhism  []      []       Comment:      Other:    []      []       Comment:         No past medical history on file.  No past surgical history on file.  No family history on file.  Social History     Socioeconomic History     Marital status:      Spouse name: Not on file     Number of children: Not on file     Years of education: Not on file     Highest education level: Not on file   Occupational History     Not on file   Tobacco Use     Smoking status: Every Day     Packs/day: .5     Types: Cigarettes     Smokeless tobacco: Never   Substance and Sexual Activity     Alcohol use: Yes     Comment: some weekends     Drug use: Yes     Comment: THC selzers     Sexual activity: Not on file   Other Topics Concern     Not on file   Social History Narrative     Not on file     Social Determinants of Health     Financial Resource Strain: Not on file   Food Insecurity: Not on file   Transportation Needs: Not on file   Physical Activity: Not on file   Stress: Not on file   Social Connections: Not on file   Interpersonal Safety: Not on file   Housing Stability: Not on file       ROS:   CONSTITUTIONAL:  No fevers or chills  EYES: negative for icterus  ENT:  negative for hearing loss, tinnitus and sore throat  RESPIRATORY:  negative for cough, sputum, dyspnea  CARDIOVASCULAR:  negative for chest pain  GASTROINTESTINAL:  negative for nausea, vomiting, diarrhea or constipation  GENITOURINARY:  negative for incontinence, dysuria, bladder emptying problems  HEME:  No easy bruising  INTEGUMENT:  negative for rash and pruritus  NEURO:  Negative for headache, seizure disorder    Allergies:   No Known Allergies    Medications:      Exam:       Body mass index is 25.07 kg/m .  Constitutional - A&O in NAD.   Eyes - no redness or discharge.  Sclera anicteric  Respiratory - no cough, no labored breathing  Musculoskeletal - range of motion normal  Skin - no discoloration, no jaundice  Neurological - no tremors.  No facial droop or dysarthria  Psychiatric - normal mood and affect  The rest of a comprehensive physical examination is deferred due to PHE (public health emergency) video visit restrictions     Diagnostics:   Recent Results (from the past 336 hour(s))   Prostate spec antigen screen  for men over 50    Collection Time: 03/15/24  6:52 AM   Result Value Ref Range    Prostate Specific Antigen Screen 0.78 0.00 - 4.50 ng/mL   EBV Capsid Antibody IgM    Collection Time: 03/15/24  6:52 AM   Result Value Ref Range    EBV Capsid Shireen IgM Instrument Value 11.3 <36.0 U/mL    EBV Capsid Antibody IgM No detectable antibody. No detectable antibody.   EBV Capsid Antibody IgG    Collection Time: 03/15/24  6:52 AM   Result Value Ref Range    EBV Capsid Shireen IgG Instrument Value 93.9 (H) <18.0 U/mL    EBV Capsid Antibody IgG Positive (A) No detectable antibody.   CMV Antibody IgG    Collection Time: 03/15/24  6:52 AM   Result Value Ref Range    CMV Shireen IgG Instrument Value <0.20 <0.60 U/mL    CMV Antibody IgG No detectable antibody. No detectable antibody.    Treponema Abs w Reflex to RPR and Titer    Collection Time: 03/15/24  6:52 AM   Result Value Ref Range    Treponema Antibody Total Nonreactive Nonreactive   HIV Antigen Antibody Combo Pretransplant Cascade    Collection Time: 03/15/24  6:52 AM   Result Value Ref Range    HIV Antigen Antibody Combo Pretransplant Nonreactive Nonreactive   Hepatitis C antibody    Collection Time: 03/15/24  6:52 AM   Result Value Ref Range    Hepatitis C Antibody Nonreactive Nonreactive   Hepatitis B surface antigen    Collection Time: 03/15/24  6:52 AM   Result Value Ref Range    Hepatitis B Surface Antigen Nonreactive Nonreactive    Hepatitis B Surface Antibody    Collection Time: 03/15/24  6:52 AM   Result Value Ref Range    Hepatitis B Surface Antibody Nonreactive     Hepatitis B Surface Antibody Instrument Value <3.50 <8.5 m[IU]/mL   Hepatitis B core antibody    Collection Time: 03/15/24  6:52 AM   Result Value Ref Range    Hepatitis B Core Antibody Total Nonreactive Nonreactive   Protein  random urine    Collection Time: 03/15/24  6:52 AM   Result Value Ref Range    Total Protein Urine mg/dL 9.3   mg/dL    Total Protein Urine mg/mg Creat 0.06 0.00 - 0.20 mg/mg Cr    Creatinine Urine mg/dL 150.0 mg/dL   Albumin Random Urine Quantitative with Creat Ratio    Collection Time: 03/15/24  6:52 AM   Result Value Ref Range    Creatinine Urine mg/dL 151.0 mg/dL    Albumin Urine mg/L <12.0 mg/L    Albumin Urine mg/g Cr     Routine UA with microscopic    Collection Time: 03/15/24  6:52 AM   Result Value Ref Range    Color Urine Light Yellow Colorless, Straw, Light Yellow, Yellow    Appearance Urine Clear Clear    Glucose Urine Negative Negative mg/dL    Bilirubin Urine Negative Negative    Ketones Urine Negative Negative mg/dL    Specific Gravity Urine 1.017 1.003 - 1.035    Blood Urine Negative Negative    pH Urine 6.0 5.0 - 7.0    Protein Albumin Urine Negative Negative mg/dL    Urobilinogen Urine Normal Normal, 2.0 mg/dL    Nitrite Urine Negative Negative    Leukocyte Esterase Urine Negative Negative    Mucus Urine Present (A) None Seen /LPF    RBC Urine <1 <=2 /HPF    WBC Urine <1 <=5 /HPF    Hyaline Casts Urine 2 <=2 /LPF   CBC with platelets    Collection Time: 03/15/24  6:52 AM   Result Value Ref Range    WBC Count 4.4 4.0 - 11.0 10e3/uL    RBC Count 5.24 4.40 - 5.90 10e6/uL    Hemoglobin 16.2 13.3 - 17.7 g/dL    Hematocrit 44.2 40.0 - 53.0 %    MCV 84 78 - 100 fL    MCH 30.9 26.5 - 33.0 pg    MCHC 36.7 (H) 31.5 - 36.5 g/dL    RDW 12.1 10.0 - 15.0 %    Platelet Count 219 150 - 450 10e3/uL   Partial thromboplastin time    Collection Time:  03/15/24  6:52 AM   Result Value Ref Range    aPTT 32 22 - 38 Seconds   INR    Collection Time: 03/15/24  6:52 AM   Result Value Ref Range    INR 1.00 0.85 - 1.15   Hemoglobin A1c    Collection Time: 03/15/24  6:52 AM   Result Value Ref Range    Hemoglobin A1C 5.6 <5.7 %   Phosphorus    Collection Time: 03/15/24  6:52 AM   Result Value Ref Range    Phosphorus 3.0 2.5 - 4.5 mg/dL   Uric acid    Collection Time: 03/15/24  6:52 AM   Result Value Ref Range    Uric Acid 5.2 3.4 - 7.0 mg/dL   Lipid Profile    Collection Time: 03/15/24  6:52 AM   Result Value Ref Range    Cholesterol 135 <200 mg/dL    Triglycerides 86 <150 mg/dL    Direct Measure HDL 37 (L) >=40 mg/dL    LDL Cholesterol Calculated 81 <=100 mg/dL    Non HDL Cholesterol 98 <130 mg/dL    Patient Fasting > 8hrs? Yes    Comprehensive metabolic panel    Collection Time: 03/15/24  6:52 AM   Result Value Ref Range    Sodium 136 135 - 145 mmol/L    Potassium 4.5 3.4 - 5.3 mmol/L    Carbon Dioxide (CO2) 22 22 - 29 mmol/L    Anion Gap 10 7 - 15 mmol/L    Urea Nitrogen 11.1 8.0 - 23.0 mg/dL    Creatinine 0.95 0.67 - 1.17 mg/dL    GFR Estimate >90 >60 mL/min/1.73m2    Calcium 9.2 8.8 - 10.2 mg/dL    Chloride 104 98 - 107 mmol/L    Glucose 108 (H) 70 - 99 mg/dL    Alkaline Phosphatase 81 40 - 150 U/L    AST 33 0 - 45 U/L    ALT 61 0 - 70 U/L    Protein Total 7.2 6.4 - 8.3 g/dL    Albumin 4.6 3.5 - 5.2 g/dL    Bilirubin Total 0.6 <=1.2 mg/dL   Quantiferon TB Gold Plus Grey Tube    Collection Time: 03/15/24  6:52 AM    Specimen: Arm, Right; Blood   Result Value Ref Range    Quantiferon Nil Tube 0.01 IU/mL   Quantiferon TB Gold Plus Green Tube    Collection Time: 03/15/24  6:52 AM    Specimen: Arm, Right; Blood   Result Value Ref Range    Quantiferon TB1 Tube 0.01 IU/mL   Quantiferon TB Gold Plus Yellow Tube    Collection Time: 03/15/24  6:52 AM    Specimen: Arm, Right; Blood   Result Value Ref Range    Quantiferon TB2 Tube 0.01    Quantiferon TB Gold Plus Purple Tube     Collection Time: 03/15/24  6:52 AM    Specimen: Arm, Right; Blood   Result Value Ref Range    Quantiferon Mitogen 10.00 IU/mL   Adult Type and Screen    Collection Time: 03/15/24  6:52 AM   Result Value Ref Range    ABO/RH(D) O POS     Antibody Screen Negative Negative    SPECIMEN EXPIRATION DATE [id number]    Quantiferon TB Gold Plus    Collection Time: 03/15/24  6:52 AM    Specimen: Arm, Right; Blood   Result Value Ref Range    Quantiferon-TB Gold Plus Negative Negative    TB1 Ag minus Nil Value 0.00 IU/mL    TB2 Ag minus Nil Value 0.00 IU/mL    Mitogen minus Nil Result 9.99 IU/mL    Nil Result 0.01 IU/mL   ABO and Rh 2nd type and screen required    Collection Time: 03/15/24  7:11 AM   Result Value Ref Range    ABO/RH(D) O POS     SPECIMEN EXPIRATION DATE [id number]    EKG 12-lead complete w/read - Clinics    Collection Time: 03/15/24 12:22 PM   Result Value Ref Range    Systolic Blood Pressure  mmHg    Diastolic Blood Pressure  mmHg    Ventricular Rate 61 BPM    Atrial Rate 61 BPM    LA Interval 198 ms    QRS Duration 86 ms     ms    QTc 388 ms    P Axis 64 degrees    R AXIS 21 degrees    T Axis 38 degrees    Interpretation ECG       Sinus rhythm  Normal ECG  No previous ECGs available         Transplant Surgery Consult Note    Medical record number: [id number]  YOB: 1960,   Consult requested by the patient for evaluation of kidney donation candidacy.    Assessment and Recommendations: Mr. Payne appears to be a good candidate for kidney donation at this point in the evaluation. The following issues will need to be addressed prior to formal review:    CT abdomen and pelvis with contrast to be ordered for assessment vascular anatomy: Yes  Dietician consult ordered: Yes  Social work consult ordered: Yes  CXR and EKG ordered:  Yes  Transplant donor labs ordered to include HLA, ABOx2, Cr, Iohexol GFR or Cr clearance, virology etc.      Patient would like to donate to his former boss.   The majority of our visit today was spent in counseling regarding the medical and surgical risks of kidney donation, the typical vianca-and post-operative experience and recovery/return to work pattern.  We also talked about post-op visits and longer term health care maintenance, as well as the implications of having one remaining kidney. This discussion included, but was not limited to rates of complications such as bleeding, infection, need for transfusion, reoperation, other organ injury, future bowel obstruction, incisional hernia, port site pain, varicocele, venous thrombosis, pulmonary embolism, renal failure, and death (3 per 10,000). The patient understands that if end stage renal failure happens that dialysis or transplant would be required.  At the conclusion of the visit, all questions had been answered and Mr. Payne's candidacy for donation will be reviewed at our Multidisciplinary Donor Selection Committee. He will stay in contact with the nurse coordinator with any concerns.      We discussed long-term risks in detail.  I discussed the articles suggesting a small increase in ESKD in donors and their limitations.        We discussed recovery, including length of hospital stay; no new meds other than pain meds on discharge; limitations after surgery (no driving for a couple of weeks; no lifting over 10 lbs or exercise stretching abdominal muscles for 6 weeks; return to work [he lifts as part of his job]; and fatigue for the first few weeks postdonation.  I informed him/her of our donor survey results on donors feeling ready to drive, and on return to feeling back to normal.  We also discussed the need for maintaining a healthy lifestyle long-term after donation      We discussed the national paired system and the possibility of participating, if not a match for the intended recipient.  I explained how the system worked.    As an aside, we have recently developed a risk calculator based on our long-term  donor follow-up.  Our goal was to provide information to donors, donor candidates and the medical community. The calculator, available at the web site below (it loads slowly) provides a risk estimate over 40 years postdonation of developing diabetes, hypertension, proteinuria, reduced eGFR and ESRD.   We hope you find it useful,  (And please let us know if you have difficulties using it or have suggestions for improvement.)  [email or web address]    40 min spent on the date of the encounter in chart review, patient visit,  documentation and/or discussion with other providers about the issues documented above.        Eric Hall MD  Surgical Professor, Kidney Transplantation                                                                                                    ---------------------------------------------------------------------------------------------------    HPI: Angel Payne is a 63 year old year old male who presents for a kidney donor evaluation.        Personal history of:   No    Yes  Cancer:    []      []  Comment:     Diabetes   []      []  Comment:    Rj   []      []  Comment:       Hepatitis   []      []  Comment:    Tuberculosis   []      []  Comment:   Back or neck pain:  []      []  Comment:      Kidney stones   []      []  Comment:                  Kidney infections  []      []  Comment:           Urinary retention  []      []    Comment:   Regular NSAID use:  []      []     Comment:      Constipation:   []      []        Comment:      Anabaptism  []      []       Comment:      Other:    []      []       Comment:         No past medical history on file.  No past surgical history on file.  No family history on file.  Social History     Socioeconomic History     Marital status:      Spouse name: Not on file     Number of children: Not on file     Years of education: Not on file     Highest education level: Not on file   Occupational  History     Not on file   Tobacco Use     Smoking status: Every Day     Packs/day: .5     Types: Cigarettes     Smokeless tobacco: Never   Substance and Sexual Activity     Alcohol use: Yes     Comment: some weekends     Drug use: Yes     Comment: THC selzers     Sexual activity: Not on file   Other Topics Concern     Not on file   Social History Narrative     Not on file     Social Determinants of Health     Financial Resource Strain: Not on file   Food Insecurity: Not on file   Transportation Needs: Not on file   Physical Activity: Not on file   Stress: Not on file   Social Connections: Not on file   Interpersonal Safety: Not on file   Housing Stability: Not on file       ROS:   CONSTITUTIONAL:  No fevers or chills  EYES: negative for icterus  ENT:  negative for hearing loss, tinnitus and sore throat  RESPIRATORY:  negative for cough, sputum, dyspnea  CARDIOVASCULAR:  negative for chest pain  GASTROINTESTINAL:  negative for nausea, vomiting, diarrhea or constipation  GENITOURINARY:  negative for incontinence, dysuria, bladder emptying problems  HEME:  No easy bruising  INTEGUMENT:  negative for rash and pruritus  NEURO:  Negative for headache, seizure disorder    Allergies:   No Known Allergies    Medications:      Exam:      Body mass index is 25.07 kg/m .  Constitutional - A&O in NAD.   Eyes - no redness or discharge.  Sclera anicteric  Respiratory - no cough, no labored breathing  Musculoskeletal - range of motion normal  Skin - no discoloration, no jaundice  Neurological - no tremors.  No facial droop or dysarthria  Psychiatric - normal mood and affect  The rest of a comprehensive physical examination is deferred due to PHE (public health emergency) video visit restrictions     Diagnostics:   Recent Results (from the past 336 hour(s))   Prostate spec antigen screen  for men over 50    Collection Time: 03/15/24  6:52 AM   Result Value Ref Range    Prostate Specific Antigen Screen 0.78 0.00 - 4.50 ng/mL   EBV  Capsid Antibody IgM    Collection Time: 03/15/24  6:52 AM   Result Value Ref Range    EBV Capsid Shireen IgM Instrument Value 11.3 <36.0 U/mL    EBV Capsid Antibody IgM No detectable antibody. No detectable antibody.   EBV Capsid Antibody IgG    Collection Time: 03/15/24  6:52 AM   Result Value Ref Range    EBV Capsid Shireen IgG Instrument Value 93.9 (H) <18.0 U/mL    EBV Capsid Antibody IgG Positive (A) No detectable antibody.   CMV Antibody IgG    Collection Time: 03/15/24  6:52 AM   Result Value Ref Range    CMV Shireen IgG Instrument Value <0.20 <0.60 U/mL    CMV Antibody IgG No detectable antibody. No detectable antibody.    Treponema Abs w Reflex to RPR and Titer    Collection Time: 03/15/24  6:52 AM   Result Value Ref Range    Treponema Antibody Total Nonreactive Nonreactive   HIV Antigen Antibody Combo Pretransplant Cascade    Collection Time: 03/15/24  6:52 AM   Result Value Ref Range    HIV Antigen Antibody Combo Pretransplant Nonreactive Nonreactive   Hepatitis C antibody    Collection Time: 03/15/24  6:52 AM   Result Value Ref Range    Hepatitis C Antibody Nonreactive Nonreactive   Hepatitis B surface antigen    Collection Time: 03/15/24  6:52 AM   Result Value Ref Range    Hepatitis B Surface Antigen Nonreactive Nonreactive   Hepatitis B Surface Antibody    Collection Time: 03/15/24  6:52 AM   Result Value Ref Range    Hepatitis B Surface Antibody Nonreactive     Hepatitis B Surface Antibody Instrument Value <3.50 <8.5 m[IU]/mL   Hepatitis B core antibody    Collection Time: 03/15/24  6:52 AM   Result Value Ref Range    Hepatitis B Core Antibody Total Nonreactive Nonreactive   Protein  random urine    Collection Time: 03/15/24  6:52 AM   Result Value Ref Range    Total Protein Urine mg/dL 9.3   mg/dL    Total Protein Urine mg/mg Creat 0.06 0.00 - 0.20 mg/mg Cr    Creatinine Urine mg/dL 150.0 mg/dL   Albumin Random Urine Quantitative with Creat Ratio    Collection Time: 03/15/24  6:52 AM   Result Value Ref Range     Creatinine Urine mg/dL 151.0 mg/dL    Albumin Urine mg/L <12.0 mg/L    Albumin Urine mg/g Cr     Routine UA with microscopic    Collection Time: 03/15/24  6:52 AM   Result Value Ref Range    Color Urine Light Yellow Colorless, Straw, Light Yellow, Yellow    Appearance Urine Clear Clear    Glucose Urine Negative Negative mg/dL    Bilirubin Urine Negative Negative    Ketones Urine Negative Negative mg/dL    Specific Gravity Urine 1.017 1.003 - 1.035    Blood Urine Negative Negative    pH Urine 6.0 5.0 - 7.0    Protein Albumin Urine Negative Negative mg/dL    Urobilinogen Urine Normal Normal, 2.0 mg/dL    Nitrite Urine Negative Negative    Leukocyte Esterase Urine Negative Negative    Mucus Urine Present (A) None Seen /LPF    RBC Urine <1 <=2 /HPF    WBC Urine <1 <=5 /HPF    Hyaline Casts Urine 2 <=2 /LPF   CBC with platelets    Collection Time: 03/15/24  6:52 AM   Result Value Ref Range    WBC Count 4.4 4.0 - 11.0 10e3/uL    RBC Count 5.24 4.40 - 5.90 10e6/uL    Hemoglobin 16.2 13.3 - 17.7 g/dL    Hematocrit 44.2 40.0 - 53.0 %    MCV 84 78 - 100 fL    MCH 30.9 26.5 - 33.0 pg    MCHC 36.7 (H) 31.5 - 36.5 g/dL    RDW 12.1 10.0 - 15.0 %    Platelet Count 219 150 - 450 10e3/uL   Partial thromboplastin time    Collection Time: 03/15/24  6:52 AM   Result Value Ref Range    aPTT 32 22 - 38 Seconds   INR    Collection Time: 03/15/24  6:52 AM   Result Value Ref Range    INR 1.00 0.85 - 1.15   Hemoglobin A1c    Collection Time: 03/15/24  6:52 AM   Result Value Ref Range    Hemoglobin A1C 5.6 <5.7 %   Phosphorus    Collection Time: 03/15/24  6:52 AM   Result Value Ref Range    Phosphorus 3.0 2.5 - 4.5 mg/dL   Uric acid    Collection Time: 03/15/24  6:52 AM   Result Value Ref Range    Uric Acid 5.2 3.4 - 7.0 mg/dL   Lipid Profile    Collection Time: 03/15/24  6:52 AM   Result Value Ref Range    Cholesterol 135 <200 mg/dL    Triglycerides 86 <150 mg/dL    Direct Measure HDL 37 (L) >=40 mg/dL    LDL Cholesterol Calculated 81 <=100  mg/dL    Non HDL Cholesterol 98 <130 mg/dL    Patient Fasting > 8hrs? Yes    Comprehensive metabolic panel    Collection Time: 03/15/24  6:52 AM   Result Value Ref Range    Sodium 136 135 - 145 mmol/L    Potassium 4.5 3.4 - 5.3 mmol/L    Carbon Dioxide (CO2) 22 22 - 29 mmol/L    Anion Gap 10 7 - 15 mmol/L    Urea Nitrogen 11.1 8.0 - 23.0 mg/dL    Creatinine 0.95 0.67 - 1.17 mg/dL    GFR Estimate >90 >60 mL/min/1.73m2    Calcium 9.2 8.8 - 10.2 mg/dL    Chloride 104 98 - 107 mmol/L    Glucose 108 (H) 70 - 99 mg/dL    Alkaline Phosphatase 81 40 - 150 U/L    AST 33 0 - 45 U/L    ALT 61 0 - 70 U/L    Protein Total 7.2 6.4 - 8.3 g/dL    Albumin 4.6 3.5 - 5.2 g/dL    Bilirubin Total 0.6 <=1.2 mg/dL   Quantiferon TB Gold Plus Grey Tube    Collection Time: 03/15/24  6:52 AM    Specimen: Arm, Right; Blood   Result Value Ref Range    Quantiferon Nil Tube 0.01 IU/mL   Quantiferon TB Gold Plus Green Tube    Collection Time: 03/15/24  6:52 AM    Specimen: Arm, Right; Blood   Result Value Ref Range    Quantiferon TB1 Tube 0.01 IU/mL   Quantiferon TB Gold Plus Yellow Tube    Collection Time: 03/15/24  6:52 AM    Specimen: Arm, Right; Blood   Result Value Ref Range    Quantiferon TB2 Tube 0.01    Quantiferon TB Gold Plus Purple Tube    Collection Time: 03/15/24  6:52 AM    Specimen: Arm, Right; Blood   Result Value Ref Range    Quantiferon Mitogen 10.00 IU/mL   Adult Type and Screen    Collection Time: 03/15/24  6:52 AM   Result Value Ref Range    ABO/RH(D) O POS     Antibody Screen Negative Negative    SPECIMEN EXPIRATION DATE [id number]    Quantiferon TB Gold Plus    Collection Time: 03/15/24  6:52 AM    Specimen: Arm, Right; Blood   Result Value Ref Range    Quantiferon-TB Gold Plus Negative Negative    TB1 Ag minus Nil Value 0.00 IU/mL    TB2 Ag minus Nil Value 0.00 IU/mL    Mitogen minus Nil Result 9.99 IU/mL    Nil Result 0.01 IU/mL   ABO and Rh 2nd type and screen required    Collection Time: 03/15/24  7:11 AM   Result  Value Ref Range    ABO/RH(D) O POS     SPECIMEN EXPIRATION DATE [id number]    EKG 12-lead complete w/read - Clinics    Collection Time: 03/15/24 12:22 PM   Result Value Ref Range    Systolic Blood Pressure  mmHg    Diastolic Blood Pressure  mmHg    Ventricular Rate 61 BPM    Atrial Rate 61 BPM    TX Interval 198 ms    QRS Duration 86 ms     ms    QTc 388 ms    P Axis 64 degrees    R AXIS 21 degrees    T Axis 38 degrees    Interpretation ECG       Sinus rhythm  Normal ECG  No previous ECGs available     Again, thank you for allowing me to participate in the care of your patient.        Sincerely,        Eric Hall MD

## 2024-03-15 NOTE — NURSING NOTE
Saw Angel in clinic on 3/15 for Living Kidney Donor Evaluation.     Angel is interested in donation for his old boss/friend.    I provided a folder which included copies of the following:    Living Kidney Donor Evaluation Consent  Paired Exchange Consent  Donor Shield Pamphlet  Living Donor Collective Study information  Kidney for Life pamphlet  Kidney Donors are Heroes! Study synopsis  Most current SRTR data.      I also provided a parking pass and food voucher.      I reviewed the Living Kidney Donor Evaluation Consent, dated 7-6-2020 and Paired Exchange/ NDD consent dated 9-.  I answered any question.    Evaluation Notes:    Internal tissue typing collected and sent.     Will need:    24 hour blood pressure (do first)    Then- Stress echo, Ct Chest (history of smoking), and PFTs

## 2024-03-15 NOTE — PROGRESS NOTES
Pt evaluated via billable telephone visit. Time spent: 15 min  Provider location: offsite     [facility] SOLID ORGAN TRANSPLANT  OUTPATIENT MNT: KIDNEY DONOR EVALUATION     Current BMI: 25 (HT 75 in,  lbs/91 kg)    8 Year Estimated Risk of T2DM  15%- FBG, BMI, low HDL, high BP today      TIME SPENT: 15 minutes  VISIT TYPE: Initial  REFERRING PHYSICIAN: Isabel  PT ACCOMPANIED BY: his wife, Veena     NUTRITION ASSESSMENT  H/o kidney stones: none   Parental h/o DM: none     Vitamins, Supplements, Pertinent Meds: none   Herbal Medicines/Supplements: none   Protein Supplement: rare     Weight hx: stable weight     PHYSICAL ACTIVITY   Walking, gardening, yard work, fishing/hunting     DIET RECALL  Breakfast Mini donuts, banana, other fruit    Lunch Leftovers    Dinner Meatloaf w/ potatoes & carrots; baked chicken w/ veggie & starch; stir dejesus with veggies, rice, chicken/pork; sometimes will have PB toast, cereal if tired from the day    Snacks Occasional ice cream s/w, cookies or baked goods sometimes    Beverages Coffee (w/ Prydeinig vanilla creamer), Mountain Dew (1 bottle/day), 1-2 glasses milk, minimal water (up to 24 oz/day)    Alcohol A few drinks/week (beer)   Dining out 2-3x/week     LABS  Recent Labs   Lab Test 03/15/24  0652   CHOL 135   HDL 37*   LDL 81   TRIG 86       FBG = 108 (fasting)  A1c = 5.6 today; 5.8 (2/2/24)  BP = high x 3 today    Prediction of Incident Diabetes Mellitus in Middle-aged Adults: The [place] Offspring Study  Lawrence Paulson MD; James B. Meigs, MD, MPH; Cherie Monae, PhD; Alexandrea Blackwell MD, MPH; Jhony Salas MD; Nicanor Fields Sr,   PhD  Pt's estimated risk for T2DM (per Table 6 above)  Pt received points for the following criteria: FBG>100, BMI>25, low HDL, high BP today   Total points: 19  8-Year estimated risk of T2DM: 15%    NUTRITION DIAGNOSIS  No nutrition diagnosis identified at this time.    NUTRITION INTERVENTION  Nutrition education provided:  Reviewed  overall healthy diet guidelines for pre and post kidney donation. Discussed maintenance of a healthy weight and Na+ intake <3000 mg/day (<2000 mg/day if HTN).    Reviewed diet suggestions to help keep BG stable- consider eliminating soda, pair protein with all meals especially BF. Wife had some good suggestions as well, she reports having diabetes.     We can dive deeper into diet if ABP comes back high- briefly reviewed how some people are salt sensitive and how fruits/veggies (potassium) may have a positive impact on BP.     Avoid the following post op d/t unknown effects on the organs:  - Herbal, Chinese, holistic, chiropractic, natural, alternative medicines and supplements  - Detoxes and cleanses  - Weight loss pills  - Protein powders or other products with extracts or herbs (ie green tea extract)    Patient Understanding: Pt verbalized understanding of education provided.  Expected Engagement: Good  Follow-Up Plans: PRN     NUTRITION GOALS  No nutrition goals identified at this time     Bettina Mcgowan, RD, LD, CCTD

## 2024-03-15 NOTE — PATIENT INSTRUCTIONS
"To help lower blood sugar:  - Protein foods (eat more of these- they do not impact blood sugar): chicken/poultry, fish, beef, pork, eggs, nuts/peanut butter, beans/lentils (ie kidney, black beans, etc)  - Fats (eat these- they do not impact blood sugar): butter, oil, avocado, cheese, etc  - Carbohydrate (break down into sugar, which directly impact your blood sugar levels)   * Fruit   * Sugary drinks (sweet tea, regular soda, juice- including 100%, sugary coffee drinks, etc)   * Potatoes (in any form- hash browns, french fries, tater tots, sweet potato, etc), rice, bread, pasta   * Junk foods- chips, candy, cookies, baked goods, ice cream, pretzels, crackers, etc    ** You don't need to completely limit or avoid the carbohydrate foods, yet consider eating them less often and smaller portion sizes. It also helps to avoid \"naked carbs\" and pair them with a protein and/or fat to balance things out more. For example, instead of eating a plain apple (will spike your blood sugar higher and more rapidly), pair it with peanut butter or a cheese stick (won't spike your blood sugar quite as high).    Some meal ideas:  Breakfast: boiled egg with raspberries or blueberries; scrambled eggs with cheese & veggies (bell pepper and onion); protein shake or bar; plain Greek yogurt + topped with berries + chopped nuts    Lunch/Dinner:   - salad with veggies, protein (chicken, sunflower seeds/nuts, boiled egg, cheese)  - low carb tortilla (brands include [place] Carb Balance, [employer] Keto) with deli meat, cheese, veggies or make a chicken caesar wrap   - salmon with roasted veggies  - hamburger w/ only half the bun + salad  - crockpot chicken (mix in salsa, little bit of broth) and shred chicken, use on salads, in wraps, etc  - steak with roasted carrots + Parmesan  - canned tuna mixed with dobbins, celery, onion, etc   - pan fried shrimp in garlic and butter over cauliflower rice + veggie     "

## 2024-03-15 NOTE — PROGRESS NOTES
Living Kidney Donor Consent per OPTN Policy 14.2 for Independent Living Donor Advocate (KATIE)    Organ Type: Unrelated Kidney Donor  Presenting Information:  Angel Payne presents to [facility] to complete a living donor evaluation since he  is interested in becoming a kidney donor for a friend, Shazia Corley.  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  He denies any pressure.  His wife is supportive.  He states his father was a heart transplant recipient, but  during surgery.      Written assurance has been obtained from the potential donor that he/she:   Is willing to donate  Is free from inducement and coercion  Has been informed that the he/she may decline to donate at any time  Has been informed that transplant centers must:   A) Offer donors an opportunity to discontinue the donor consent or evaluation process in a way that is protected and confidential  B) Provide an independent living donor advocate (KATIE) to assist the potential donor during this process    The following was presented to the potential donor in a language in which the potential donor is able to engage in meaningful dialogue:   Education and instruction about all phases of the living donation process including:   Consent  Medical and psychosocial evaluation  Information about the surgical procedure  Pre and post operative care  Benefits of post operative follow up  Disclosure that the recovery hospital will take all reasonable precautions to provide confidentiality for the donor/recipient  Disclosure that it is a federal crime for any person to knowingly acquire, obtain or otherwise transfer any human organ for valuable consideration  Disclosure that the recovery hospital must provide an independent living donor advocate (KATIE)  Disclosure that health information obtained during the evaluation is subject to the same regulations as all records and could  reveal conditions that must be reported to local, state, or federal public health authorities  Disclosure that the [facility] is required to report living donor follow up information at 6 months, 1 year, and 2 years, and that the potential donor must commit to post operative follow up testing coordinated by the [facility]    Disclosure has been provided that these risks may be transient or permanent & include but are not limited to:  Potential psychosocial risks:  Problems with body image  Post-surgery depression or anxiety  Feelings of emotional distress or bereavement if recipient experiences any recurrent disease or in the event of the recipient s death  Impact of donation on the donor s lifestyle, such as limited ability to exercise in the short term post operative recovery period, no driving for the first 2 weeks post op or until the donor is no longer needing pain medications that impair the ability to drive.      Potential financial impacts:  Personal expenses of travel, housing, , lost wages related to donation might not be reimbursed. However, resources may be available to defray some donation-related expenses.   Need for life-long follow up at the donor s expense  Loss of employment or income  Negative impact on the ability to obtain future employment  Negative impact on the ability to obtain, maintain, or afford health, disability, and life insurance  Future health problems experienced by living donors following donation may not be covered by the recipient s insurance      PREPARATION FOR DONATION, RECOVERY, AND POTENTIAL SHORT-LONG-TERM OUTCOMES:  Understanding of the Living Donation Process:  We discussed the role of Independent Living Donor Advocate.  Short and long term medical and psychosocial risks to both, donor and recipient were reviewed and he is capable of understanding the risks.  Post surgical restrictions (2 weeks no driving, 6 weeks no lifting over 10 lbs) were  reviewed and he appears capable of adhering to the post surgical requirements. The need for a caregiver was discussed and his wife will care for him .  The risk of poor psychosocial outcome including problems with body image, post-surgery depression or anxiety, or feelings of emotional distress or bereavement if recipient experiences any recurrent disease, poor outcome or death was reviewed.  Additionally, potential financial implications, including the risk of having difficulty obtaining health care insurance, life insurance, disability insurance, or long term care insurance were reviewed, as were available donor grants to assist with donor related expenses.      IMPRESSIONS/RECOMMENDATIONS:  Angel Payne appears highly motivated to donate a kidney to Shazia Maurerjerardo.  He appears capable of understanding this information and making an informed medical decision.n  His wife was with him today and was taking notes and asking questions as needed.  She is supportive.  As KATIE, no concerns were identified today.  He has my contact information and is aware that I am available thoughout the donation process.    Contact Information:  AMANDA VERA, [facility]  Independent Living Donor Advocate  [facility]  Phone - [phone]  [email or web address]      Time Spent: 30 minutes

## 2024-03-15 NOTE — NURSING NOTE
Chief Complaint   Patient presents with    Blood Draw     Labs drawn via piv placed by RN in lab. Vital signs taken.      Labs drawn from PIV placed by RN. Line flushed with saline. Vital signs taken. Pt checked in for appointment(s).    Lynsey Weems RN

## 2024-03-15 NOTE — PROGRESS NOTES
TRANSPLANT NEPHROLOGY DONOR EVALUATION    Assessment and Plan:  # Prospective Kidney Transplant Donor: Patient with a lot of issues that need to be addressed prior to donation. Patient's blood pressure is elevated at this visit, kidney function appears to be acceptable with Iohexol pending, and urinalysis is bland.    # Elevated BP : Average BP-146/93, recommend 24h ABPM    # Cardiac risk: no exertional symptoms, however with multiple risk factors including age,smoking, possible HTN, prediabetes,+strong family hx of premature CAD in parents and brother,  Recommend stress echocardiogram    # Pre-diabetes: PdW2P-6.8% 2024, +family hx of DM in brother. BMI-25. Recommend lifestyle modifications    # Smokin pyrs.  +Family hx of lung cancer in mother at age 69. Recommend low dose CT chest for lung cancer screening    # Hx of NSAIDs: infrequent and he stopped recently, counseled about avoiding    # Dental health: multiple lost teeth, plan for dental implant, f/up dentist    # Cancer screening: colonoscopy 3/2024 (polyp removed per patient-obtain path report), PSA    Discussed the risks of donating a kidney, including the surgical risk and the possible risks of living with one kidney.    Education about expected post-donation kidney function and how chronic kidney disease (CKD) and end stage kidney disease (ESKD) might potentially impact the donor in the future, include, but not limited to:       - On average, donors will have 25-35% permanent loss of kidney function at donation.       - Baseline risk of ESKD may slightly exceed that of members of the general        population with the same demographic profile.       - Donor risks must be interpreted in light of known epidemiology of both CKD or         ESKD, such as that CKD generally develops in midlife (40-50 years old) and ESKD         generally develops after age 60.       - Medical evaluation of young potential donors cannot predict lifetime risk of CKD or          ESKD.       - Donors may be at higher risk for CKD if they sustain damage to the remaining         kidney.       - Development of CKD and progression of ESKD may be more rapid with only 1         kidney.       - Some type of kidney replacement therapy, either kidney transplant or dialysis, is         required when reaching ESKD.    Potential medical or surgical risks include, but not limited to:       - Death.       - Scars, pain, fatigue, and other consequences typical of any surgical procedure.       - Decreased kidney function.       - Abdominal or bowel symptoms, such as bloating and nausea, and developing         bowel obstruction.       - Kidney failure (ESKD) and the need for a kidney transplant or dialysis for the donor.       - Impact of obesity, hypertension, or other donor-specific medical conditions on         morbidity and mortality of the potential donor.    Patients overall evaluation will be discussed with the transplant team and a final recommendation on the patients' suitability to be a kidney transplant donor will be made at that time.    Consult:  Angel Payne was seen in consultation at the request of Dr. Eric Hall for evaluation as a potential kidney transplant donor.    Reason for Visit:  Angel Payne is a 63 year old male who presents for a kidney donor evaluation.  Patient would like to donate to his previous boss .    Present Condition and Donor-Related Medical History:    Mr Payne is a 64 yo male with hx of smoking x 40 yr. He doesn't have regular care with a primary care physician and is unaware of any prior issues with HTN, DM, renal disease. He uses NSAIS prn headaches only rarely,. He just completed a screening colonoscopy last week and 1 polyp was removed, path unavailable, per patient he was advised to repeat in 10 yrs.         Kidney Disease Hx:       h/o Kidney Problems: No   Family h/o Genetic Kidney Disease: No       h/o Hypertension: Yes   mother    Usual  "Blood Pressure:  ~130s/80s       h/o Protein in Urine: No    h/o Blood in Urine: No       h/o Kidney Stones: No     h/o Kidney Injury: No       h/o Recurrent UTI: No   h/o Genitourinary Problems: No       h/o Chronic NSAID Use: Yes only 2x/week for headaches recently stopped         Other Medical Hx:       h/o Diabetes: No             h/o Gastrointestinal, Pancreas or Liver Problems: No       h/o Lung or Heart Problems: No       h/o Hematologic Problems: No  h/o Bleeding or Clotting Problems: No       h/o Cancer: No       h/o Infection Problems: No                Skin Cancer Risk:       h/o more than 50 moles: No       h/o extensive sun exposure: No       h/o melanoma: No       Family h/o melanoma: No         Mental Health Assessment:       h/o Depression: No       h/o Psychiatric Illness: No       h/o Suicidal Attempt(s): No    COVID Status:  Vaccination Up To Date: No, due for next dose  H/o COVID Infection: Yes     Review Of Systems:   A comprehensive review of systems was obtained and negative, except as noted in the HPI or PMH.    Past Medical History:   History was taken from the patient as noted below.  negative    Past Social History:   +smoking 40 yrs (1.2-1 pk/d), alcohol 2-3 x/wk, no illicit    Past surgical Hx:  Bilateral knee surgery -replacement  Hernia surgery at age 13    Personal or family history of anesthesia problems: No    Family History:   Neg renal disease       Specific Family History in First Degree Relatives:       FH of Kidney Dz: No  FH of Diabetes: Yes brother       FH of Hypertension: Yes mother FH of CAD: Yes both parents and brother:       \" CAD father at age 57 and mother at age 64, brother passed at age 52\".       FH of Cancer: Yes lung Ca in mother  FH of Kidney Cancer: No    Personal History:   Social History     Socioeconomic History    Marital status:      Spouse name: Not on file    Number of children: Not on file    Years of education: Not on file    Highest education " level: Not on file   Occupational History    Not on file   Tobacco Use    Smoking status: Every Day     Packs/day: .5     Types: Cigarettes    Smokeless tobacco: Never   Substance and Sexual Activity    Alcohol use: Yes     Comment: some weekends    Drug use: Yes     Comment: THC selzers    Sexual activity: Not on file   Other Topics Concern    Not on file   Social History Narrative    Not on file     Social Determinants of Health     Financial Resource Strain: Not on file   Food Insecurity: Not on file   Transportation Needs: Not on file   Physical Activity: Not on file   Stress: Not on file   Social Connections: Not on file   Interpersonal Safety: Not on file   Housing Stability: Not on file          Specific Social History:       Health Insurance Status: Yes       Employment Status: Full time  Occupation: machinest                       Living Arrangements: lives with their spouse       Social Support: Yes       Presence of increased risk for disease transmission behaviors as defined by PHS guidelines: No        Allergies:  No Known Allergies    Medications:  No current outpatient medications on file.     No current facility-administered medications for this visit.     There are no discontinued medications.      Vitals:      3/15/2024     7:57 AM 3/15/2024     7:58 AM 3/15/2024     7:59 AM   Vital Signs   Systolic 132 160 146   Diastolic 91 94 93       Exam:   GENERAL APPEARANCE: alert and no distress  HENT: mouth without ulcers or lesions  RESP: lungs clear to auscultation - no rales, rhonchi or wheezes  CV: regular rhythm, normal rate, no rub, no murmur  EDEMA: no LE edema bilaterally  ABDOMEN: soft, nondistended, nontender, bowel sounds normal  MS: extremities normal - no gross deformities noted, no evidence of inflammation in joints, no muscle tenderness  SKIN: no rash    Results:   Labs and imaging were ordered for this visit and reviewed by me.  Recent Results (from the past 336 hour(s))   Prostate spec  antigen screen  for men over 50    Collection Time: 03/15/24  6:52 AM   Result Value Ref Range    Prostate Specific Antigen Screen 0.78 0.00 - 4.50 ng/mL   Protein  random urine    Collection Time: 03/15/24  6:52 AM   Result Value Ref Range    Total Protein Urine mg/dL 9.3   mg/dL    Total Protein Urine mg/mg Creat 0.06 0.00 - 0.20 mg/mg Cr    Creatinine Urine mg/dL 150.0 mg/dL   Routine UA with microscopic    Collection Time: 03/15/24  6:52 AM   Result Value Ref Range    Color Urine Light Yellow Colorless, Straw, Light Yellow, Yellow    Appearance Urine Clear Clear    Glucose Urine Negative Negative mg/dL    Bilirubin Urine Negative Negative    Ketones Urine Negative Negative mg/dL    Specific Gravity Urine 1.017 1.003 - 1.035    Blood Urine Negative Negative    pH Urine 6.0 5.0 - 7.0    Protein Albumin Urine Negative Negative mg/dL    Urobilinogen Urine Normal Normal, 2.0 mg/dL    Nitrite Urine Negative Negative    Leukocyte Esterase Urine Negative Negative    Mucus Urine Present (A) None Seen /LPF    RBC Urine <1 <=2 /HPF    WBC Urine <1 <=5 /HPF    Hyaline Casts Urine 2 <=2 /LPF   CBC with platelets    Collection Time: 03/15/24  6:52 AM   Result Value Ref Range    WBC Count 4.4 4.0 - 11.0 10e3/uL    RBC Count 5.24 4.40 - 5.90 10e6/uL    Hemoglobin 16.2 13.3 - 17.7 g/dL    Hematocrit 44.2 40.0 - 53.0 %    MCV 84 78 - 100 fL    MCH 30.9 26.5 - 33.0 pg    MCHC 36.7 (H) 31.5 - 36.5 g/dL    RDW 12.1 10.0 - 15.0 %    Platelet Count 219 150 - 450 10e3/uL   Partial thromboplastin time    Collection Time: 03/15/24  6:52 AM   Result Value Ref Range    aPTT 32 22 - 38 Seconds   INR    Collection Time: 03/15/24  6:52 AM   Result Value Ref Range    INR 1.00 0.85 - 1.15   Phosphorus    Collection Time: 03/15/24  6:52 AM   Result Value Ref Range    Phosphorus 3.0 2.5 - 4.5 mg/dL   Uric acid    Collection Time: 03/15/24  6:52 AM   Result Value Ref Range    Uric Acid 5.2 3.4 - 7.0 mg/dL   Lipid Profile    Collection Time:  03/15/24  6:52 AM   Result Value Ref Range    Cholesterol 135 <200 mg/dL    Triglycerides 86 <150 mg/dL    Direct Measure HDL 37 (L) >=40 mg/dL    LDL Cholesterol Calculated 81 <=100 mg/dL    Non HDL Cholesterol 98 <130 mg/dL    Patient Fasting > 8hrs? Yes    Comprehensive metabolic panel    Collection Time: 03/15/24  6:52 AM   Result Value Ref Range    Sodium 136 135 - 145 mmol/L    Potassium 4.5 3.4 - 5.3 mmol/L    Carbon Dioxide (CO2) 22 22 - 29 mmol/L    Anion Gap 10 7 - 15 mmol/L    Urea Nitrogen 11.1 8.0 - 23.0 mg/dL    Creatinine 0.95 0.67 - 1.17 mg/dL    GFR Estimate >90 >60 mL/min/1.73m2    Calcium 9.2 8.8 - 10.2 mg/dL    Chloride 104 98 - 107 mmol/L    Glucose 108 (H) 70 - 99 mg/dL    Alkaline Phosphatase 81 40 - 150 U/L    AST 33 0 - 45 U/L    ALT 61 0 - 70 U/L    Protein Total 7.2 6.4 - 8.3 g/dL    Albumin 4.6 3.5 - 5.2 g/dL    Bilirubin Total 0.6 <=1.2 mg/dL   Adult Type and Screen    Collection Time: 03/15/24  6:52 AM   Result Value Ref Range    ABO/RH(D) O POS     Antibody Screen Negative Negative    SPECIMEN EXPIRATION DATE [id number]    ABO and Rh 2nd type and screen required    Collection Time: 03/15/24  7:11 AM   Result Value Ref Range    ABO/RH(D) O POS     SPECIMEN EXPIRATION DATE [id number]

## 2024-03-15 NOTE — PROGRESS NOTES
"Psychosocial Evaluation  Living Organ Donation per OPTN Policy 14.1.A  Organ Type: Kidney  Presenting Information:  Angel presents to the [facility] Solid Organ Transplant Clinic to complete a psychosocial evaluation since he is interested in becoming a kidney donor for his friend and neighbor.    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 He has been there for past 6 years, has access to PTO, FMLA and STD, does not endorse any financial concerns or worry about taking time off work. He states he will be retiring in September. SW reviewed and explained Donor Shield program and benefits.    Health Insurance Status: yes, through wife's employer    Family History: Both parents , one brother , one sister living but no contact with her. Angel has two adult children ages 26 and 24 who live locally.    General Health: Angel states he does not have any concerns about his health and does not manage any chronic health conditions. States he did have knee replacement surgery in the past and plans on getting dentures in the future. He has PCP Dr. Bowling at [facility].     Mental Health: Angel states his overall mental health is \"good\" and does not endorse any concerns. The donor denies any past or present treatment for mental health issues, such as anxiety, depression, bipolar disorder, or disorders of thought such as schizophrenia or schizoaffective disorder.  There is no history of personality disorder or eating disorders.  The donor denies any need to see a counselor or therapist at this time.  The donor denies any past suicidal ideation, plans, or past attempts.  The donor denies any use of psychotropic medications " "at this time or in the past.  The donor denies any past history of hospitalization for psychiatric illnesses.  The donor denies any past history of ADHD or ADD.  The donor denies any history of educational issues or need for special educational services in their past history.     Alcohol and Drug Use/Abuse/Dependency: Angel reports that he consumes approximately 1-3 servings of alcohol per week ( a serving is defined here as one, 12 oz beer, or one 4 oz glass of wine, or one 1 /2 oz of hard liquor).  The donor denies any past history of abuse or dependency on alcohol or illicit drugs. The donor denies any current use of street drugs, including marijuana, vaping, edible marijuana, or other mood altering substances.  The donor denies any past history of negative consequences of use of alcohol or drugs such as a DUI, relationship problems, problems with fulfilling parenting or other care giving responsibilities, or problems with work performance.         Cigarette Use: Half pack per day. Understands he will likely need to quit/cut back for donation purposes and is willing to do so.    Legal: none    Coping with major surgery/associated stress: Angel states has a former [employer], he has learned to \"let it roll\" and focus on what he has control over and let go of what he does not.     Support System: Wife works from home and will be able to care for him post donation.    DONOR SPECIFIC INFORMATION:  Relationship to Recipient: Friend and neighbor.    Decision Process/Motivation to Donate: Angel states his dad has had organ transplant in the past which afforded him 20 more years with his family and Angel wants to pay it forward. His friend is also starting dialysis.     High risk behaviors as defined by [place] Public Health Services (PHS) that have potential to increase risk of disease transmission were reviewed and no risk identified.     PREPARATION FOR DONATION, RECOVERY, AND POTENTIAL SHORT-LONG-TERM " OUTCOMES:  Understanding of the Living Donation Process:  We discussed the role of living donor .  Short and long term medical and psychosocial risks to both, donor and recipient were reviewed and expressed understanding.  Post surgical restrictions (2 weeks no driving, 6 weeks no lifting over 10 lbs) were reviewed and he appears capable of adhering to the post surgical requirements. The need for a caregiver was discussed and he has supportive spouse. The risk of poor psychosocial outcome including problems with body image, post-surgery depression or anxiety, or feelings of emotional distress or bereavement if recipient experiences any recurrent disease, poor outcome or death was reviewed.  Additionally, potential financial implications, including the risk of having difficulty obtaining health care insurance, life insurance, disability insurance, or long term care insurance were reviewed, as were available donor grants to assist with donor related expenses.      We also discussed some unique issues that arise with paired kidney donation, which include the uncertainty of the timing and the importance of having a employment situation and support system that is able to provide sustained support and flexibility.    He appears capable of understanding this information and making an informed medical decision.    Impressions/Recommendations:   Angel is highly motivated to donate a kidney to his friend. His decision to donate is free of inducement, coercion, or other undue pressure.  His housing, finances and employment are stable.  No current/active mental health or chemical abuse issues were identified.  The need for a caregiver was reviewed and he is able to identify a plan to meet his post operative care needs. He appears capable of making an informed medical decision.  No psychosocial contraindications to living organ donation were identified and  I support Angel s desire to donate a kidney to his  friend.         Contact Information:   AMANDA Lee, [facility]  Living Donor   Phone: [phone]  Pager: [phone]  [email or web address]      Time Spent: 30 minutes

## 2024-03-15 NOTE — LETTER
3/15/2024         RE: Angel Payne  [address]  [facility] [address]        Dear Colleague,    Thank you for referring your patient, Angel Payne, to the [facility] TRANSPLANT [facility]. Please see a copy of my visit note below.    Pt evaluated via billable telephone visit. Time spent: 15 min  Provider location: offsite     [facility] SOLID ORGAN TRANSPLANT  OUTPATIENT MNT: KIDNEY DONOR EVALUATION     Current BMI: 25 (HT 75 in,  lbs/91 kg)    8 Year Estimated Risk of T2DM  15%- FBG, BMI, low HDL, high BP today      TIME SPENT: 15 minutes  VISIT TYPE: Initial  REFERRING PHYSICIAN: Isabel  PT ACCOMPANIED BY: his wife, Veena     NUTRITION ASSESSMENT  H/o kidney stones: none   Parental h/o DM: none     Vitamins, Supplements, Pertinent Meds: none   Herbal Medicines/Supplements: none   Protein Supplement: rare     Weight hx: stable weight     PHYSICAL ACTIVITY   Walking, gardening, yard work, fishing/hunting     DIET RECALL  Breakfast Mini donuts, banana, other fruit    Lunch Leftovers    Dinner Meatloaf w/ potatoes & carrots; baked chicken w/ veggie & starch; stir dejesus with veggies, rice, chicken/pork; sometimes will have PB toast, cereal if tired from the day    Snacks Occasional ice cream s/w, cookies or baked goods sometimes    Beverages Coffee (w/ [place] vanilla creamer), Mountain Dew (1 bottle/day), 1-2 glasses milk, minimal water (up to 24 oz/day)    Alcohol A few drinks/week (beer)   Dining out 2-3x/week     LABS  Recent Labs   Lab Test 03/15/24  0652   CHOL 135   HDL 37*   LDL 81   TRIG 86       FBG = 108 (fasting)  A1c = 5.6 today; 5.8 (2/2/24)  BP = high x 3 today    Prediction of Incident Diabetes Mellitus in Middle-aged Adults: The [place] Offspring Study  Lawrence Paulson MD; James B. Meigs, MD, MPH; Cherie Monae, PhD; Alexandrea Blackwell MD, MPH; Jhony Salas MD; Nicanor Fields Sr,   PhD  Pt's estimated risk for T2DM (per Table 6 above)  Pt received points for  the following criteria: FBG>100, BMI>25, low HDL, high BP today   Total points: 19  8-Year estimated risk of T2DM: 15%    NUTRITION DIAGNOSIS  No nutrition diagnosis identified at this time.    NUTRITION INTERVENTION  Nutrition education provided:  Reviewed overall healthy diet guidelines for pre and post kidney donation. Discussed maintenance of a healthy weight and Na+ intake <3000 mg/day (<2000 mg/day if HTN).    Reviewed diet suggestions to help keep BG stable- consider eliminating soda, pair protein with all meals especially BF. Wife had some good suggestions as well, she reports having diabetes.     We can dive deeper into diet if ABP comes back high- briefly reviewed how some people are salt sensitive and how fruits/veggies (potassium) may have a positive impact on BP.     Avoid the following post op d/t unknown effects on the organs:  - Herbal, Chinese, holistic, chiropractic, natural, alternative medicines and supplements  - Detoxes and cleanses  - Weight loss pills  - Protein powders or other products with extracts or herbs (ie green tea extract)    Patient Understanding: Pt verbalized understanding of education provided.  Expected Engagement: Good  Follow-Up Plans: PRN     NUTRITION GOALS  No nutrition goals identified at this time     Bettina Mcgowan RD, LD, CCTD                                  Again, thank you for allowing me to participate in the care of your patient.        Sincerely,        Bettina Mcgowan RD

## 2024-03-15 NOTE — LETTER
3/15/2024         RE: Angel Payne  [address]  [facility] [address]        Dear Colleague,    Thank you for referring your patient, Angel Payne, to the [facility] TRANSPLANT CLINIC. Please see a copy of my visit note below.    .    Again, thank you for allowing me to participate in the care of your patient.        Sincerely,        Transplant Care Coordinator

## 2024-03-15 NOTE — LETTER
3/15/2024         RE: Angel Payne  [address]  [place] [address]        Dear Colleague,    Thank you for referring your patient, Angel Payne, to the [facility]. Please see a copy of my visit note below.    Living Kidney Donor Consent per OPTN Policy 14.2 for Independent Living Donor Advocate (KATIE)    Organ Type: Unrelated Kidney Donor  Presenting Information:  Angel Payne presents to [facility] to complete a living donor evaluation since he  is interested in becoming a kidney donor for a friend, Shazia Corley.  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  He denies any pressure.  His wife is supportive.  He states his father was a heart transplant recipient, but  during surgery.      Written assurance has been obtained from the potential donor that he/she:   Is willing to donate  Is free from inducement and coercion  Has been informed that the he/she may decline to donate at any time  Has been informed that transplant centers must:   A) Offer donors an opportunity to discontinue the donor consent or evaluation process in a way that is protected and confidential  B) Provide an independent living donor advocate (KATIE) to assist the potential donor during this process    The following was presented to the potential donor in a language in which the potential donor is able to engage in meaningful dialogue:   Education and instruction about all phases of the living donation process including:   Consent  Medical and psychosocial evaluation  Information about the surgical procedure  Pre and post operative care  Benefits of post operative follow up  Disclosure that the recovery hospital will take all reasonable precautions to provide confidentiality for the donor/recipient  Disclosure that it is a federal crime for any person to knowingly acquire, obtain or otherwise transfer any  human organ for valuable consideration  Disclosure that the [facility] must provide an independent living donor advocate (KATIE)  Disclosure that health information obtained during the evaluation is subject to the same regulations as all records and could reveal conditions that must be reported to local, state, or federal public health authorities  Disclosure that the [facility] is required to report living donor follow up information at 6 months, 1 year, and 2 years, and that the potential donor must commit to post operative follow up testing coordinated by the [facility]    Disclosure has been provided that these risks may be transient or permanent & include but are not limited to:  Potential psychosocial risks:  Problems with body image  Post-surgery depression or anxiety  Feelings of emotional distress or bereavement if recipient experiences any recurrent disease or in the event of the recipient s death  Impact of donation on the donor s lifestyle, such as limited ability to exercise in the short term post operative recovery period, no driving for the first 2 weeks post op or until the donor is no longer needing pain medications that impair the ability to drive.      Potential financial impacts:  Personal expenses of travel, housing, , lost wages related to donation might not be reimbursed. However, resources may be available to defray some donation-related expenses.   Need for life-long follow up at the donor s expense  Loss of employment or income  Negative impact on the ability to obtain future employment  Negative impact on the ability to obtain, maintain, or afford health, disability, and life insurance  Future health problems experienced by living donors following donation may not be covered by the recipient s insurance      PREPARATION FOR DONATION, RECOVERY, AND POTENTIAL SHORT-LONG-TERM OUTCOMES:  Understanding of the Living Donation Process:  We discussed the role of  Independent Living Donor Advocate.  Short and long term medical and psychosocial risks to both, donor and recipient were reviewed and he is capable of understanding the risks.  Post surgical restrictions (2 weeks no driving, 6 weeks no lifting over 10 lbs) were reviewed and he appears capable of adhering to the post surgical requirements. The need for a caregiver was discussed and his wife will care for him .  The risk of poor psychosocial outcome including problems with body image, post-surgery depression or anxiety, or feelings of emotional distress or bereavement if recipient experiences any recurrent disease, poor outcome or death was reviewed.  Additionally, potential financial implications, including the risk of having difficulty obtaining health care insurance, life insurance, disability insurance, or long term care insurance were reviewed, as were available donor grants to assist with donor related expenses.      IMPRESSIONS/RECOMMENDATIONS:  Angel Payne appears highly motivated to donate a kidney to Shazia Patel.  He appears capable of understanding this information and making an informed medical decision.n  His wife was with him today and was taking notes and asking questions as needed.  She is supportive.  As KATIE, no concerns were identified today.  He has my contact information and is aware that I am available thoughout the donation process.    Contact Information:  AMANDA VERA LICSW  Independent Living Donor Advocate  [employer]  Phone - [phone]  [email or web address]      Time Spent: 30 minutes      Again, thank you for allowing me to participate in the care of your patient.        Sincerely,        FRANK Oglesby

## 2024-03-15 NOTE — LETTER
3/15/2024         RE: Angel Payne  [address]  [place] [address]        Dear Colleague,    Thank you for referring your patient, Angel Payne, to the [facility]. Please see a copy of my visit note below.    Donor Iohexol test    Angel Payne presents today to [facility] for a Donor Iohexol test.      Progress note:  ID verified by name and .     The following information was verified with the patient:  Female Patients is there any possibility of being pregnant NA  Is there a history of allergy (skin rash, swelling, ect) to:   A.  Iodine (except skin reactions to betadine): No   B. Intravenous radio-contrast agents: No   C. Seafood No     present during visit today: Not Applicable.    R.N. provided patient with educational handout regarding timed test. Yes    PIV placed in left AC and Iohexol administered over 2 minutes.  Positive blood return verified before and after injection. PIV removed.  20 gauge PIV placed in right AC by [facility] lab staff for blood draws and CT this afternoon.    Medication administered:  Iohexol (Omnipaque 300mg iodine/ml concentration) 5 mls.    Start time: 715  Stop time: 718    Administrations This Visit       iohexol (OMNIPAQUE) 350 MG/ML injectable solution 4 mL       Admin Date  03/15/2024 Action  $Given Dose  4 mL Route  Intravenous Documented By  Maria G Oneil, RN                        Evaluation nurse in transplant to draw labs at 2 and 4 hours post iohexol administration.  Patient given a slip with the times to get labs drawn and verbalized understanding of the plan.    Patient tolerated the procedure:  Yes    After the infusion patient was discharged to the next appointment.    Maria G Oneil RN        Again, thank you for allowing me to participate in the care of your patient.        Sincerely,        Specialty Infusion Nurse

## 2024-03-16 LAB
CREAT UR-MCNC: 151 MG/DL
GAMMA INTERFERON BACKGROUND BLD IA-ACNC: 0.01 IU/ML
HBV CORE AB SERPL QL IA: NONREACTIVE
HBV SURFACE AB SERPL IA-ACNC: <3.5 M[IU]/ML
HBV SURFACE AB SERPL IA-ACNC: NONREACTIVE M[IU]/ML
HBV SURFACE AG SERPL QL IA: NONREACTIVE
HCV AB SERPL QL IA: NONREACTIVE
HIV 1+2 AB+HIV1 P24 AG SERPL QL IA: NONREACTIVE
M TB IFN-G BLD-IMP: NEGATIVE
M TB IFN-G CD4+ BCKGRND COR BLD-ACNC: 9.99 IU/ML
MICROALBUMIN UR-MCNC: <12 MG/L
MICROALBUMIN/CREAT UR: NORMAL MG/G{CREAT}
MITOGEN IGNF BCKGRD COR BLD-ACNC: 0 IU/ML
MITOGEN IGNF BCKGRD COR BLD-ACNC: 0 IU/ML
QUANTIFERON MITOGEN: 10 IU/ML
QUANTIFERON NIL TUBE: 0.01 IU/ML
QUANTIFERON TB1 TUBE: 0.01 IU/ML
QUANTIFERON TB2 TUBE: 0.01

## 2024-03-17 NOTE — PROGRESS NOTES
Transplant Surgery Consult Note    Medical record number: [id number]  YOB: 1960,   Consult requested by the patient for evaluation of kidney donation candidacy.    Assessment and Recommendations: Mr. Payne appears to be a good candidate for kidney donation at this point in the evaluation. The following issues will need to be addressed prior to formal review:    CT abdomen and pelvis with contrast to be ordered for assessment vascular anatomy: Yes  Dietician consult ordered: Yes  Social work consult ordered: Yes  CXR and EKG ordered:  Yes  Transplant donor labs ordered to include HLA, ABOx2, Cr, Iohexol GFR or Cr clearance, virology etc.      Patient would like to donate to his boss The majority of our visit today was spent in counseling regarding the medical and surgical risks of kidney donation, the typical vianca-and post-operative experience and recovery/return to work pattern.  We also talked about post-op visits and longer term health care maintenance, as well as the implications of having one remaining kidney. This discussion included, but was not limited to rates of complications such as bleeding, infection, need for transfusion, reoperation, other organ injury, future bowel obstruction, incisional hernia, port site pain, varicocele, venous thrombosis, pulmonary embolism, renal failure, and death (3 per 10,000). The patient understands that if end stage renal failure happens that dialysis or transplant would be required.  At the conclusion of the visit, all questions had been answered and Mr. Payne's candidacy for donation will be reviewed at our Multidisciplinary Donor Selection Committee. He will stay in contact with the nurse coordinator with any concerns.      As an aside, we have recently developed a risk calculator based on our long-term donor follow-up.  Our goal was to provide information to donors, donor candidates and the medical community. The calculator, available at the web  site below (it loads slowly) provides a risk estimate over 40 years postdonation of developing diabetes, hypertension, proteinuria, reduced eGFR and ESRD.   We hope you find it useful,  (And please let us know if you have difficulties using it or have suggestions for improvement.)  [email or web address]    40 min spent on the date of the encounter in chart review, patient visit,  documentation and/or discussion with other providers about the issues documented above.        Eric Hall MD  Surgical Professor, Kidney Transplantation                                                                                                    ---------------------------------------------------------------------------------------------------    HPI: Angel Payne is a 63 year old year old male who presents for a kidney donor evaluation.        Personal history of:   No    Yes  Cancer:    []      []  Comment:     Diabetes   []      []  Comment:    Thomkayleeis   []      []  Comment:       Hepatitis   []      []  Comment:    Tuberculosis   []      []  Comment:   Back or neck pain:  []      []  Comment:      Kidney stones   []      []  Comment:                  Kidney infections  []      []  Comment:           Urinary retention  []      []    Comment:   Regular NSAID use:  []      []     Comment:      Constipation:   []      []        Comment:      [employer]  []      []       Comment:      Other:    []      []       Comment:         No past medical history on file.  No past surgical history on file.  No family history on file.  Social History     Socioeconomic History    Marital status:      Spouse name: Not on file    Number of children: Not on file    Years of education: Not on file    Highest education level: Not on file   Occupational History    Not on file   Tobacco Use    Smoking status: Every Day     Packs/day: .5     Types: Cigarettes    Smokeless tobacco: Never   Substance and  Sexual Activity    Alcohol use: Yes     Comment: some weekends    Drug use: Yes     Comment: THC selzers    Sexual activity: Not on file   Other Topics Concern    Not on file   Social History Narrative    Not on file     Social Determinants of Health     Financial Resource Strain: Not on file   Food Insecurity: Not on file   Transportation Needs: Not on file   Physical Activity: Not on file   Stress: Not on file   Social Connections: Not on file   Interpersonal Safety: Not on file   Housing Stability: Not on file       ROS:   CONSTITUTIONAL:  No fevers or chills  EYES: negative for icterus  ENT:  negative for hearing loss, tinnitus and sore throat  RESPIRATORY:  negative for cough, sputum, dyspnea  CARDIOVASCULAR:  negative for chest pain  GASTROINTESTINAL:  negative for nausea, vomiting, diarrhea or constipation  GENITOURINARY:  negative for incontinence, dysuria, bladder emptying problems  HEME:  No easy bruising  INTEGUMENT:  negative for rash and pruritus  NEURO:  Negative for headache, seizure disorder    Allergies:   No Known Allergies    Medications:      Exam:      Body mass index is 25.07 kg/m .  Constitutional - A&O in NAD.   Eyes - no redness or discharge.  Sclera anicteric  Respiratory - no cough, no labored breathing  Musculoskeletal - range of motion normal  Skin - no discoloration, no jaundice  Neurological - no tremors.  No facial droop or dysarthria  Psychiatric - normal mood and affect  The rest of a comprehensive physical examination is deferred due to PHE (public health emergency) video visit restrictions     Diagnostics:   Recent Results (from the past 336 hour(s))   Prostate spec antigen screen  for men over 50    Collection Time: 03/15/24  6:52 AM   Result Value Ref Range    Prostate Specific Antigen Screen 0.78 0.00 - 4.50 ng/mL   EBV Capsid Antibody IgM    Collection Time: 03/15/24  6:52 AM   Result Value Ref Range    EBV Capsid Shireen IgM Instrument Value 11.3 <36.0 U/mL    EBV Capsid Antibody  IgM No detectable antibody. No detectable antibody.   EBV Capsid Antibody IgG    Collection Time: 03/15/24  6:52 AM   Result Value Ref Range    EBV Capsid Shireen IgG Instrument Value 93.9 (H) <18.0 U/mL    EBV Capsid Antibody IgG Positive (A) No detectable antibody.   CMV Antibody IgG    Collection Time: 03/15/24  6:52 AM   Result Value Ref Range    CMV Shireen IgG Instrument Value <0.20 <0.60 U/mL    CMV Antibody IgG No detectable antibody. No detectable antibody.    Treponema Abs w Reflex to RPR and Titer    Collection Time: 03/15/24  6:52 AM   Result Value Ref Range    Treponema Antibody Total Nonreactive Nonreactive   HIV Antigen Antibody Combo Pretransplant Cascade    Collection Time: 03/15/24  6:52 AM   Result Value Ref Range    HIV Antigen Antibody Combo Pretransplant Nonreactive Nonreactive   Hepatitis C antibody    Collection Time: 03/15/24  6:52 AM   Result Value Ref Range    Hepatitis C Antibody Nonreactive Nonreactive   Hepatitis B surface antigen    Collection Time: 03/15/24  6:52 AM   Result Value Ref Range    Hepatitis B Surface Antigen Nonreactive Nonreactive   Hepatitis B Surface Antibody    Collection Time: 03/15/24  6:52 AM   Result Value Ref Range    Hepatitis B Surface Antibody Nonreactive     Hepatitis B Surface Antibody Instrument Value <3.50 <8.5 m[IU]/mL   Hepatitis B core antibody    Collection Time: 03/15/24  6:52 AM   Result Value Ref Range    Hepatitis B Core Antibody Total Nonreactive Nonreactive   Protein  random urine    Collection Time: 03/15/24  6:52 AM   Result Value Ref Range    Total Protein Urine mg/dL 9.3   mg/dL    Total Protein Urine mg/mg Creat 0.06 0.00 - 0.20 mg/mg Cr    Creatinine Urine mg/dL 150.0 mg/dL   Albumin Random Urine Quantitative with Creat Ratio    Collection Time: 03/15/24  6:52 AM   Result Value Ref Range    Creatinine Urine mg/dL 151.0 mg/dL    Albumin Urine mg/L <12.0 mg/L    Albumin Urine mg/g Cr     Routine UA with microscopic    Collection Time: 03/15/24  6:52  AM   Result Value Ref Range    Color Urine Light Yellow Colorless, Straw, Light Yellow, Yellow    Appearance Urine Clear Clear    Glucose Urine Negative Negative mg/dL    Bilirubin Urine Negative Negative    Ketones Urine Negative Negative mg/dL    Specific Gravity Urine 1.017 1.003 - 1.035    Blood Urine Negative Negative    pH Urine 6.0 5.0 - 7.0    Protein Albumin Urine Negative Negative mg/dL    Urobilinogen Urine Normal Normal, 2.0 mg/dL    Nitrite Urine Negative Negative    Leukocyte Esterase Urine Negative Negative    Mucus Urine Present (A) None Seen /LPF    RBC Urine <1 <=2 /HPF    WBC Urine <1 <=5 /HPF    Hyaline Casts Urine 2 <=2 /LPF   CBC with platelets    Collection Time: 03/15/24  6:52 AM   Result Value Ref Range    WBC Count 4.4 4.0 - 11.0 10e3/uL    RBC Count 5.24 4.40 - 5.90 10e6/uL    Hemoglobin 16.2 13.3 - 17.7 g/dL    Hematocrit 44.2 40.0 - 53.0 %    MCV 84 78 - 100 fL    MCH 30.9 26.5 - 33.0 pg    MCHC 36.7 (H) 31.5 - 36.5 g/dL    RDW 12.1 10.0 - 15.0 %    Platelet Count 219 150 - 450 10e3/uL   Partial thromboplastin time    Collection Time: 03/15/24  6:52 AM   Result Value Ref Range    aPTT 32 22 - 38 Seconds   INR    Collection Time: 03/15/24  6:52 AM   Result Value Ref Range    INR 1.00 0.85 - 1.15   Hemoglobin A1c    Collection Time: 03/15/24  6:52 AM   Result Value Ref Range    Hemoglobin A1C 5.6 <5.7 %   Phosphorus    Collection Time: 03/15/24  6:52 AM   Result Value Ref Range    Phosphorus 3.0 2.5 - 4.5 mg/dL   Uric acid    Collection Time: 03/15/24  6:52 AM   Result Value Ref Range    Uric Acid 5.2 3.4 - 7.0 mg/dL   Lipid Profile    Collection Time: 03/15/24  6:52 AM   Result Value Ref Range    Cholesterol 135 <200 mg/dL    Triglycerides 86 <150 mg/dL    Direct Measure HDL 37 (L) >=40 mg/dL    LDL Cholesterol Calculated 81 <=100 mg/dL    Non HDL Cholesterol 98 <130 mg/dL    Patient Fasting > 8hrs? Yes    Comprehensive metabolic panel    Collection Time: 03/15/24  6:52 AM   Result Value  Ref Range    Sodium 136 135 - 145 mmol/L    Potassium 4.5 3.4 - 5.3 mmol/L    Carbon Dioxide (CO2) 22 22 - 29 mmol/L    Anion Gap 10 7 - 15 mmol/L    Urea Nitrogen 11.1 8.0 - 23.0 mg/dL    Creatinine 0.95 0.67 - 1.17 mg/dL    GFR Estimate >90 >60 mL/min/1.73m2    Calcium 9.2 8.8 - 10.2 mg/dL    Chloride 104 98 - 107 mmol/L    Glucose 108 (H) 70 - 99 mg/dL    Alkaline Phosphatase 81 40 - 150 U/L    AST 33 0 - 45 U/L    ALT 61 0 - 70 U/L    Protein Total 7.2 6.4 - 8.3 g/dL    Albumin 4.6 3.5 - 5.2 g/dL    Bilirubin Total 0.6 <=1.2 mg/dL   Quantiferon TB Gold Plus Grey Tube    Collection Time: 03/15/24  6:52 AM    Specimen: Arm, Right; Blood   Result Value Ref Range    Quantiferon Nil Tube 0.01 IU/mL   Quantiferon TB Gold Plus Green Tube    Collection Time: 03/15/24  6:52 AM    Specimen: Arm, Right; Blood   Result Value Ref Range    Quantiferon TB1 Tube 0.01 IU/mL   Quantiferon TB Gold Plus Yellow Tube    Collection Time: 03/15/24  6:52 AM    Specimen: Arm, Right; Blood   Result Value Ref Range    Quantiferon TB2 Tube 0.01    Quantiferon TB Gold Plus Purple Tube    Collection Time: 03/15/24  6:52 AM    Specimen: Arm, Right; Blood   Result Value Ref Range    Quantiferon Mitogen 10.00 IU/mL   Adult Type and Screen    Collection Time: 03/15/24  6:52 AM   Result Value Ref Range    ABO/RH(D) O POS     Antibody Screen Negative Negative    SPECIMEN EXPIRATION DATE [id number]    Quantiferon TB Gold Plus    Collection Time: 03/15/24  6:52 AM    Specimen: Arm, Right; Blood   Result Value Ref Range    Quantiferon-TB Gold Plus Negative Negative    TB1 Ag minus Nil Value 0.00 IU/mL    TB2 Ag minus Nil Value 0.00 IU/mL    Mitogen minus Nil Result 9.99 IU/mL    Nil Result 0.01 IU/mL   ABO and Rh 2nd type and screen required    Collection Time: 03/15/24  7:11 AM   Result Value Ref Range    ABO/RH(D) O POS     SPECIMEN EXPIRATION DATE [id number]    EKG 12-lead complete w/read - Clinics    Collection Time: 03/15/24 12:22 PM    Result Value Ref Range    Systolic Blood Pressure  mmHg    Diastolic Blood Pressure  mmHg    Ventricular Rate 61 BPM    Atrial Rate 61 BPM    OR Interval 198 ms    QRS Duration 86 ms     ms    QTc 388 ms    P Axis 64 degrees    R AXIS 21 degrees    T Axis 38 degrees    Interpretation ECG       Sinus rhythm  Normal ECG  No previous ECGs available

## 2024-03-17 NOTE — PROGRESS NOTES
Transplant Surgery Consult Note    Medical record number: [id number]  YOB: 1960,   Consult requested by the patient for evaluation of kidney donation candidacy.    Assessment and Recommendations: Mr. Payne appears to be a good candidate for kidney donation at this point in the evaluation. The following issues will need to be addressed prior to formal review:    CT abdomen and pelvis with contrast to be ordered for assessment vascular anatomy: Yes  Dietician consult ordered: Yes  Social work consult ordered: Yes  CXR and EKG ordered:  Yes  Transplant donor labs ordered to include HLA, ABOx2, Cr, Iohexol GFR or Cr clearance, virology etc.      Patient would like to donate to his former boss  The majority of our visit today was spent in counseling regarding the medical and surgical risks of kidney donation, the typical vianca-and post-operative experience and recovery/return to work pattern.  We also talked about post-op visits and longer term health care maintenance, as well as the implications of having one remaining kidney. This discussion included, but was not limited to rates of complications such as bleeding, infection, need for transfusion, reoperation, other organ injury, future bowel obstruction, incisional hernia, port site pain, varicocele, venous thrombosis, pulmonary embolism, renal failure, and death (3 per 10,000). The patient understands that if end stage renal failure happens that dialysis or transplant would be required.  At the conclusion of the visit, all questions had been answered and Mr. Payne's candidacy for donation will be reviewed at our Multidisciplinary Donor Selection Committee. He will stay in contact with the nurse coordinator with any concerns.             We discussed long-term risks in detail.  I discussed the articles suggesting a small increase in ESKD in donors and their limitations.        We discussed recovery, including length of hospital stay; no new meds  other than pain meds on discharge; limitations after surgery (no driving for a couple of weeks; no lifting over 10 lbs or exercise stretching abdominal muscles for 6 weeks; return to work [he lifts as part of his job]; and fatigue for the first few weeks postdonation.  I informed him of our donor survey results on donors feeling ready to drive, and on return to feeling back to normal.  We also discussed the need for maintaining a healthy lifestyle long-term after donation  We discussed the national [employer] system and the possibility of participating, if not a match for the intended recipient.  I explained how the system worked.    As an aside, we have recently developed a risk calculator based on our long-term donor follow-up.  Our goal was to provide information to donors, donor candidates and the medical community. The calculator, available at the web site below (it loads slowly) provides a risk estimate over 40 years postdonation of developing diabetes, hypertension, proteinuria, reduced eGFR and ESRD.   We hope you find it useful,  (And please let us know if you have difficulties using it or have suggestions for improvement.)  [email or web address]    40 min spent on the date of the encounter in chart review, patient visit,  documentation and/or discussion with other providers about the issues documented above.        Eric Hall MD  Surgical Professor, Kidney Transplantation                                                                                                    ---------------------------------------------------------------------------------------------------    HPI: Angel Payne is a 63 year old year old male who presents for a kidney donor evaluation.        Personal history of:   No    Yes  Cancer:    []      []  Comment:     Diabetes   []      []  Comment:    Rj   []      []  Comment:       Hepatitis   []      []  Comment:    Tuberculosis   []      []   Comment:   Back or neck pain:  []      []  Comment:      Kidney stones   []      []  Comment:                  Kidney infections  []      []  Comment:           Urinary retention  []      []    Comment:   Regular NSAID use:  []      []     Comment:      Constipation:   []      []        Comment:      Anglican  []      []       Comment:      Other:    []      []       Comment:         No past medical history on file.  No past surgical history on file.  No family history on file.  Social History     Socioeconomic History    Marital status:      Spouse name: Not on file    Number of children: Not on file    Years of education: Not on file    Highest education level: Not on file   Occupational History    Not on file   Tobacco Use    Smoking status: Every Day     Packs/day: .5     Types: Cigarettes    Smokeless tobacco: Never   Substance and Sexual Activity    Alcohol use: Yes     Comment: some weekends    Drug use: Yes     Comment: THC selzers    Sexual activity: Not on file   Other Topics Concern    Not on file   Social History Narrative    Not on file     Social Determinants of Health     Financial Resource Strain: Not on file   Food Insecurity: Not on file   Transportation Needs: Not on file   Physical Activity: Not on file   Stress: Not on file   Social Connections: Not on file   Interpersonal Safety: Not on file   Housing Stability: Not on file       ROS:   CONSTITUTIONAL:  No fevers or chills  EYES: negative for icterus  ENT:  negative for hearing loss, tinnitus and sore throat  RESPIRATORY:  negative for cough, sputum, dyspnea  CARDIOVASCULAR:  negative for chest pain  GASTROINTESTINAL:  negative for nausea, vomiting, diarrhea or constipation  GENITOURINARY:  negative for incontinence, dysuria, bladder emptying problems  HEME:  No easy bruising  INTEGUMENT:  negative for rash and pruritus  NEURO:  Negative for headache, seizure disorder    Allergies:   No Known  Allergies    Medications:      Exam:      Body mass index is 25.07 kg/m .  Constitutional - A&O in NAD.   Eyes - no redness or discharge.  Sclera anicteric  Respiratory - no cough, no labored breathing  Musculoskeletal - range of motion normal  Skin - no discoloration, no jaundice  Neurological - no tremors.  No facial droop or dysarthria  Psychiatric - normal mood and affect  The rest of a comprehensive physical examination is deferred due to PHE (public health emergency) video visit restrictions     Diagnostics:   Recent Results (from the past 336 hour(s))   Prostate spec antigen screen  for men over 50    Collection Time: 03/15/24  6:52 AM   Result Value Ref Range    Prostate Specific Antigen Screen 0.78 0.00 - 4.50 ng/mL   EBV Capsid Antibody IgM    Collection Time: 03/15/24  6:52 AM   Result Value Ref Range    EBV Capsid Shireen IgM Instrument Value 11.3 <36.0 U/mL    EBV Capsid Antibody IgM No detectable antibody. No detectable antibody.   EBV Capsid Antibody IgG    Collection Time: 03/15/24  6:52 AM   Result Value Ref Range    EBV Capsid Shireen IgG Instrument Value 93.9 (H) <18.0 U/mL    EBV Capsid Antibody IgG Positive (A) No detectable antibody.   CMV Antibody IgG    Collection Time: 03/15/24  6:52 AM   Result Value Ref Range    CMV Shireen IgG Instrument Value <0.20 <0.60 U/mL    CMV Antibody IgG No detectable antibody. No detectable antibody.    Treponema Abs w Reflex to RPR and Titer    Collection Time: 03/15/24  6:52 AM   Result Value Ref Range    Treponema Antibody Total Nonreactive Nonreactive   HIV Antigen Antibody Combo Pretransplant Cascade    Collection Time: 03/15/24  6:52 AM   Result Value Ref Range    HIV Antigen Antibody Combo Pretransplant Nonreactive Nonreactive   Hepatitis C antibody    Collection Time: 03/15/24  6:52 AM   Result Value Ref Range    Hepatitis C Antibody Nonreactive Nonreactive   Hepatitis B surface antigen    Collection Time: 03/15/24  6:52 AM   Result Value Ref Range    Hepatitis B  Surface Antigen Nonreactive Nonreactive   Hepatitis B Surface Antibody    Collection Time: 03/15/24  6:52 AM   Result Value Ref Range    Hepatitis B Surface Antibody Nonreactive     Hepatitis B Surface Antibody Instrument Value <3.50 <8.5 m[IU]/mL   Hepatitis B core antibody    Collection Time: 03/15/24  6:52 AM   Result Value Ref Range    Hepatitis B Core Antibody Total Nonreactive Nonreactive   Protein  random urine    Collection Time: 03/15/24  6:52 AM   Result Value Ref Range    Total Protein Urine mg/dL 9.3   mg/dL    Total Protein Urine mg/mg Creat 0.06 0.00 - 0.20 mg/mg Cr    Creatinine Urine mg/dL 150.0 mg/dL   Albumin Random Urine Quantitative with Creat Ratio    Collection Time: 03/15/24  6:52 AM   Result Value Ref Range    Creatinine Urine mg/dL 151.0 mg/dL    Albumin Urine mg/L <12.0 mg/L    Albumin Urine mg/g Cr     Routine UA with microscopic    Collection Time: 03/15/24  6:52 AM   Result Value Ref Range    Color Urine Light Yellow Colorless, Straw, Light Yellow, Yellow    Appearance Urine Clear Clear    Glucose Urine Negative Negative mg/dL    Bilirubin Urine Negative Negative    Ketones Urine Negative Negative mg/dL    Specific Gravity Urine 1.017 1.003 - 1.035    Blood Urine Negative Negative    pH Urine 6.0 5.0 - 7.0    Protein Albumin Urine Negative Negative mg/dL    Urobilinogen Urine Normal Normal, 2.0 mg/dL    Nitrite Urine Negative Negative    Leukocyte Esterase Urine Negative Negative    Mucus Urine Present (A) None Seen /LPF    RBC Urine <1 <=2 /HPF    WBC Urine <1 <=5 /HPF    Hyaline Casts Urine 2 <=2 /LPF   CBC with platelets    Collection Time: 03/15/24  6:52 AM   Result Value Ref Range    WBC Count 4.4 4.0 - 11.0 10e3/uL    RBC Count 5.24 4.40 - 5.90 10e6/uL    Hemoglobin 16.2 13.3 - 17.7 g/dL    Hematocrit 44.2 40.0 - 53.0 %    MCV 84 78 - 100 fL    MCH 30.9 26.5 - 33.0 pg    MCHC 36.7 (H) 31.5 - 36.5 g/dL    RDW 12.1 10.0 - 15.0 %    Platelet Count 219 150 - 450 10e3/uL   Partial  thromboplastin time    Collection Time: 03/15/24  6:52 AM   Result Value Ref Range    aPTT 32 22 - 38 Seconds   INR    Collection Time: 03/15/24  6:52 AM   Result Value Ref Range    INR 1.00 0.85 - 1.15   Hemoglobin A1c    Collection Time: 03/15/24  6:52 AM   Result Value Ref Range    Hemoglobin A1C 5.6 <5.7 %   Phosphorus    Collection Time: 03/15/24  6:52 AM   Result Value Ref Range    Phosphorus 3.0 2.5 - 4.5 mg/dL   Uric acid    Collection Time: 03/15/24  6:52 AM   Result Value Ref Range    Uric Acid 5.2 3.4 - 7.0 mg/dL   Lipid Profile    Collection Time: 03/15/24  6:52 AM   Result Value Ref Range    Cholesterol 135 <200 mg/dL    Triglycerides 86 <150 mg/dL    Direct Measure HDL 37 (L) >=40 mg/dL    LDL Cholesterol Calculated 81 <=100 mg/dL    Non HDL Cholesterol 98 <130 mg/dL    Patient Fasting > 8hrs? Yes    Comprehensive metabolic panel    Collection Time: 03/15/24  6:52 AM   Result Value Ref Range    Sodium 136 135 - 145 mmol/L    Potassium 4.5 3.4 - 5.3 mmol/L    Carbon Dioxide (CO2) 22 22 - 29 mmol/L    Anion Gap 10 7 - 15 mmol/L    Urea Nitrogen 11.1 8.0 - 23.0 mg/dL    Creatinine 0.95 0.67 - 1.17 mg/dL    GFR Estimate >90 >60 mL/min/1.73m2    Calcium 9.2 8.8 - 10.2 mg/dL    Chloride 104 98 - 107 mmol/L    Glucose 108 (H) 70 - 99 mg/dL    Alkaline Phosphatase 81 40 - 150 U/L    AST 33 0 - 45 U/L    ALT 61 0 - 70 U/L    Protein Total 7.2 6.4 - 8.3 g/dL    Albumin 4.6 3.5 - 5.2 g/dL    Bilirubin Total 0.6 <=1.2 mg/dL   Quantiferon TB Gold Plus Grey Tube    Collection Time: 03/15/24  6:52 AM    Specimen: Arm, Right; Blood   Result Value Ref Range    Quantiferon Nil Tube 0.01 IU/mL   Quantiferon TB Gold Plus Green Tube    Collection Time: 03/15/24  6:52 AM    Specimen: Arm, Right; Blood   Result Value Ref Range    Quantiferon TB1 Tube 0.01 IU/mL   Quantiferon TB Gold Plus Yellow Tube    Collection Time: 03/15/24  6:52 AM    Specimen: Arm, Right; Blood   Result Value Ref Range    Quantiferon TB2 Tube 0.01     Quantiferon TB Gold Plus Purple Tube    Collection Time: 03/15/24  6:52 AM    Specimen: Arm, Right; Blood   Result Value Ref Range    Quantiferon Mitogen 10.00 IU/mL   Adult Type and Screen    Collection Time: 03/15/24  6:52 AM   Result Value Ref Range    ABO/RH(D) O POS     Antibody Screen Negative Negative    SPECIMEN EXPIRATION DATE [id number]    Quantiferon TB Gold Plus    Collection Time: 03/15/24  6:52 AM    Specimen: Arm, Right; Blood   Result Value Ref Range    Quantiferon-TB Gold Plus Negative Negative    TB1 Ag minus Nil Value 0.00 IU/mL    TB2 Ag minus Nil Value 0.00 IU/mL    Mitogen minus Nil Result 9.99 IU/mL    Nil Result 0.01 IU/mL   ABO and Rh 2nd type and screen required    Collection Time: 03/15/24  7:11 AM   Result Value Ref Range    ABO/RH(D) O POS     SPECIMEN EXPIRATION DATE [id number]    EKG 12-lead complete w/read - Clinics    Collection Time: 03/15/24 12:22 PM   Result Value Ref Range    Systolic Blood Pressure  mmHg    Diastolic Blood Pressure  mmHg    Ventricular Rate 61 BPM    Atrial Rate 61 BPM    NE Interval 198 ms    QRS Duration 86 ms     ms    QTc 388 ms    P Axis 64 degrees    R AXIS 21 degrees    T Axis 38 degrees    Interpretation ECG       Sinus rhythm  Normal ECG  No previous ECGs available         Transplant Surgery Consult Note    Medical record number: [id number]  YOB: 1960,   Consult requested by the patient for evaluation of kidney donation candidacy.    Assessment and Recommendations: Mr. Payne appears to be a good candidate for kidney donation at this point in the evaluation. The following issues will need to be addressed prior to formal review:    CT abdomen and pelvis with contrast to be ordered for assessment vascular anatomy: Yes  Dietician consult ordered: Yes  Social work consult ordered: Yes  CXR and EKG ordered:  Yes  Transplant donor labs ordered to include HLA, ABOx2, Cr, Iohexol GFR or Cr clearance, virology etc.        Elmer was at clinic with is wife.  He would like to donate to his former boss. The majority of our visit today was spent in counseling regarding the medical and surgical risks of kidney donation, the typical vianca-and post-operative experience and recovery/return to work pattern.  We also talked about post-op visits and longer term health care maintenance, as well as the implications of having one remaining kidney. This discussion included, but was not limited to rates of complications such as bleeding, infection, need for transfusion, reoperation, other organ injury, future bowel obstruction, incisional hernia, port site pain, varicocele, venous thrombosis, pulmonary embolism, renal failure, and death (3 per 10,000). The patient understands that if end stage renal failure happens that dialysis or transplant would be required.     We discussed long-term risks in detail.  I discussed the articles suggesting a small increase in ESKD in donors and their limitations.        We discussed recovery, including length of hospital stay; no new meds other than pain meds on discharge; limitations after surgery (no driving for a couple of weeks; no lifting over 10 lbs or exercise stretching abdominal muscles for 6 weeks; return to work [he lifts as part of his job]; and fatigue for the first few weeks postdonation.  I informed him/ of our donor survey results on donors feeling ready to drive, and on return to feeling back to normal.  We also discussed the need for maintaining a healthy lifestyle long-term after donation    We discussed the national paired system and the possibility of participating, if not a match for the intended recipient.  I explained how the system worked.   At the conclusion of the visit, all questions had been answered and Mr. Payne's candidacy for donation will be reviewed at our Multidisciplinary Donor Selection Committee. He will stay in contact with the nurse coordinator with any concerns.      As  an aside, we have recently developed a risk calculator based on our long-term donor follow-up.  Our goal was to provide information to donors, donor candidates and the medical community. The calculator, available at the web site below (it loads slowly) provides a risk estimate over 40 years postdonation of developing diabetes, hypertension, proteinuria, reduced eGFR and ESRD.   We hope you find it useful,  (And please let us know if you have difficulties using it or have suggestions for improvement.)  [email or web address]    40 min spent on the date of the encounter in chart review, patient visit,  documentation and/or discussion with other providers about the issues documented above.        Eric Hall MD  Surgical Professor, Kidney Transplantation                                                                                                    ---------------------------------------------------------------------------------------------------    HPI: Angel Payne is a 63 year old year old male who presents for a kidney donor evaluation.        Personal history of:   No    Yes  Cancer:    []      []  Comment:     Diabetes   []      []  Comment:    Rj   []      []  Comment:       Hepatitis   []      []  Comment:    Tuberculosis   []      []  Comment:   Back or neck pain:  []      []  Comment:      Kidney stones   []      []  Comment:                  Kidney infections  []      []  Comment:           Urinary retention  []      []    Comment:   Regular NSAID use:  []      []     Comment:      Constipation:   []      []        Comment:      Scientology  []      []       Comment:      Other:    []      []       Comment:         No past medical history on file.  No past surgical history on file.  No family history on file.  Social History     Socioeconomic History    Marital status:      Spouse name: Not on file    Number of children: Not on file    Years of  education: Not on file    Highest education level: Not on file   Occupational History    Not on file   Tobacco Use    Smoking status: Every Day     Packs/day: .5     Types: Cigarettes    Smokeless tobacco: Never   Substance and Sexual Activity    Alcohol use: Yes     Comment: some weekends    Drug use: Yes     Comment: [employer] selzers    Sexual activity: Not on file   Other Topics Concern    Not on file   Social History Narrative    Not on file     Social Determinants of Health     Financial Resource Strain: Not on file   Food Insecurity: Not on file   Transportation Needs: Not on file   Physical Activity: Not on file   Stress: Not on file   Social Connections: Not on file   Interpersonal Safety: Not on file   Housing Stability: Not on file       ROS:   CONSTITUTIONAL:  No fevers or chills  EYES: negative for icterus  ENT:  negative for hearing loss, tinnitus and sore throat  RESPIRATORY:  negative for cough, sputum, dyspnea  CARDIOVASCULAR:  negative for chest pain  GASTROINTESTINAL:  negative for nausea, vomiting, diarrhea or constipation  GENITOURINARY:  negative for incontinence, dysuria, bladder emptying problems  HEME:  No easy bruising  INTEGUMENT:  negative for rash and pruritus  NEURO:  Negative for headache, seizure disorder    Allergies:   No Known Allergies    Medications:      Exam:      Body mass index is 25.07 kg/m .  Constitutional - A&O in NAD.   Eyes - no redness or discharge.  Sclera anicteric  Respiratory - no cough, no labored breathing  Musculoskeletal - range of motion normal  Skin - no discoloration, no jaundice  Neurological - no tremors.  No facial droop or dysarthria  Psychiatric - normal mood and affect  The rest of a comprehensive physical examination is deferred due to PHE (public health emergency) video visit restrictions     Diagnostics:   Recent Results (from the past 336 hour(s))   Prostate spec antigen screen  for men over 50    Collection Time: 03/15/24  6:52 AM   Result Value Ref  Range    Prostate Specific Antigen Screen 0.78 0.00 - 4.50 ng/mL   EBV Capsid Antibody IgM    Collection Time: 03/15/24  6:52 AM   Result Value Ref Range    EBV Capsid Shireen IgM Instrument Value 11.3 <36.0 U/mL    EBV Capsid Antibody IgM No detectable antibody. No detectable antibody.   EBV Capsid Antibody IgG    Collection Time: 03/15/24  6:52 AM   Result Value Ref Range    EBV Capsid Shireen IgG Instrument Value 93.9 (H) <18.0 U/mL    EBV Capsid Antibody IgG Positive (A) No detectable antibody.   CMV Antibody IgG    Collection Time: 03/15/24  6:52 AM   Result Value Ref Range    CMV Shireen IgG Instrument Value <0.20 <0.60 U/mL    CMV Antibody IgG No detectable antibody. No detectable antibody.    Treponema Abs w Reflex to RPR and Titer    Collection Time: 03/15/24  6:52 AM   Result Value Ref Range    Treponema Antibody Total Nonreactive Nonreactive   HIV Antigen Antibody Combo Pretransplant Cascade    Collection Time: 03/15/24  6:52 AM   Result Value Ref Range    HIV Antigen Antibody Combo Pretransplant Nonreactive Nonreactive   Hepatitis C antibody    Collection Time: 03/15/24  6:52 AM   Result Value Ref Range    Hepatitis C Antibody Nonreactive Nonreactive   Hepatitis B surface antigen    Collection Time: 03/15/24  6:52 AM   Result Value Ref Range    Hepatitis B Surface Antigen Nonreactive Nonreactive   Hepatitis B Surface Antibody    Collection Time: 03/15/24  6:52 AM   Result Value Ref Range    Hepatitis B Surface Antibody Nonreactive     Hepatitis B Surface Antibody Instrument Value <3.50 <8.5 m[IU]/mL   Hepatitis B core antibody    Collection Time: 03/15/24  6:52 AM   Result Value Ref Range    Hepatitis B Core Antibody Total Nonreactive Nonreactive   Protein  random urine    Collection Time: 03/15/24  6:52 AM   Result Value Ref Range    Total Protein Urine mg/dL 9.3   mg/dL    Total Protein Urine mg/mg Creat 0.06 0.00 - 0.20 mg/mg Cr    Creatinine Urine mg/dL 150.0 mg/dL   Albumin Random Urine Quantitative with Creat  Ratio    Collection Time: 03/15/24  6:52 AM   Result Value Ref Range    Creatinine Urine mg/dL 151.0 mg/dL    Albumin Urine mg/L <12.0 mg/L    Albumin Urine mg/g Cr     Routine UA with microscopic    Collection Time: 03/15/24  6:52 AM   Result Value Ref Range    Color Urine Light Yellow Colorless, Straw, Light Yellow, Yellow    Appearance Urine Clear Clear    Glucose Urine Negative Negative mg/dL    Bilirubin Urine Negative Negative    Ketones Urine Negative Negative mg/dL    Specific Gravity Urine 1.017 1.003 - 1.035    Blood Urine Negative Negative    pH Urine 6.0 5.0 - 7.0    Protein Albumin Urine Negative Negative mg/dL    Urobilinogen Urine Normal Normal, 2.0 mg/dL    Nitrite Urine Negative Negative    Leukocyte Esterase Urine Negative Negative    Mucus Urine Present (A) None Seen /LPF    RBC Urine <1 <=2 /HPF    WBC Urine <1 <=5 /HPF    Hyaline Casts Urine 2 <=2 /LPF   CBC with platelets    Collection Time: 03/15/24  6:52 AM   Result Value Ref Range    WBC Count 4.4 4.0 - 11.0 10e3/uL    RBC Count 5.24 4.40 - 5.90 10e6/uL    Hemoglobin 16.2 13.3 - 17.7 g/dL    Hematocrit 44.2 40.0 - 53.0 %    MCV 84 78 - 100 fL    MCH 30.9 26.5 - 33.0 pg    MCHC 36.7 (H) 31.5 - 36.5 g/dL    RDW 12.1 10.0 - 15.0 %    Platelet Count 219 150 - 450 10e3/uL   Partial thromboplastin time    Collection Time: 03/15/24  6:52 AM   Result Value Ref Range    aPTT 32 22 - 38 Seconds   INR    Collection Time: 03/15/24  6:52 AM   Result Value Ref Range    INR 1.00 0.85 - 1.15   Hemoglobin A1c    Collection Time: 03/15/24  6:52 AM   Result Value Ref Range    Hemoglobin A1C 5.6 <5.7 %   Phosphorus    Collection Time: 03/15/24  6:52 AM   Result Value Ref Range    Phosphorus 3.0 2.5 - 4.5 mg/dL   Uric acid    Collection Time: 03/15/24  6:52 AM   Result Value Ref Range    Uric Acid 5.2 3.4 - 7.0 mg/dL   Lipid Profile    Collection Time: 03/15/24  6:52 AM   Result Value Ref Range    Cholesterol 135 <200 mg/dL    Triglycerides 86 <150 mg/dL     Direct Measure HDL 37 (L) >=40 mg/dL    LDL Cholesterol Calculated 81 <=100 mg/dL    Non HDL Cholesterol 98 <130 mg/dL    Patient Fasting > 8hrs? Yes    Comprehensive metabolic panel    Collection Time: 03/15/24  6:52 AM   Result Value Ref Range    Sodium 136 135 - 145 mmol/L    Potassium 4.5 3.4 - 5.3 mmol/L    Carbon Dioxide (CO2) 22 22 - 29 mmol/L    Anion Gap 10 7 - 15 mmol/L    Urea Nitrogen 11.1 8.0 - 23.0 mg/dL    Creatinine 0.95 0.67 - 1.17 mg/dL    GFR Estimate >90 >60 mL/min/1.73m2    Calcium 9.2 8.8 - 10.2 mg/dL    Chloride 104 98 - 107 mmol/L    Glucose 108 (H) 70 - 99 mg/dL    Alkaline Phosphatase 81 40 - 150 U/L    AST 33 0 - 45 U/L    ALT 61 0 - 70 U/L    Protein Total 7.2 6.4 - 8.3 g/dL    Albumin 4.6 3.5 - 5.2 g/dL    Bilirubin Total 0.6 <=1.2 mg/dL   Quantiferon TB Gold Plus Grey Tube    Collection Time: 03/15/24  6:52 AM    Specimen: Arm, Right; Blood   Result Value Ref Range    Quantiferon Nil Tube 0.01 IU/mL   Quantiferon TB Gold Plus Green Tube    Collection Time: 03/15/24  6:52 AM    Specimen: Arm, Right; Blood   Result Value Ref Range    Quantiferon TB1 Tube 0.01 IU/mL   Quantiferon TB Gold Plus Yellow Tube    Collection Time: 03/15/24  6:52 AM    Specimen: Arm, Right; Blood   Result Value Ref Range    Quantiferon TB2 Tube 0.01    Quantiferon TB Gold Plus Purple Tube    Collection Time: 03/15/24  6:52 AM    Specimen: Arm, Right; Blood   Result Value Ref Range    Quantiferon Mitogen 10.00 IU/mL   Adult Type and Screen    Collection Time: 03/15/24  6:52 AM   Result Value Ref Range    ABO/RH(D) O POS     Antibody Screen Negative Negative    SPECIMEN EXPIRATION DATE [id number]    Quantiferon TB Gold Plus    Collection Time: 03/15/24  6:52 AM    Specimen: Arm, Right; Blood   Result Value Ref Range    Quantiferon-TB Gold Plus Negative Negative    TB1 Ag minus Nil Value 0.00 IU/mL    TB2 Ag minus Nil Value 0.00 IU/mL    Mitogen minus Nil Result 9.99 IU/mL    Nil Result 0.01 IU/mL   ABO and Rh  2nd type and screen required    Collection Time: 03/15/24  7:11 AM   Result Value Ref Range    ABO/RH(D) O POS     SPECIMEN EXPIRATION DATE [id number]    EKG 12-lead complete w/read - Clinics    Collection Time: 03/15/24 12:22 PM   Result Value Ref Range    Systolic Blood Pressure  mmHg    Diastolic Blood Pressure  mmHg    Ventricular Rate 61 BPM    Atrial Rate 61 BPM    OK Interval 198 ms    QRS Duration 86 ms     ms    QTc 388 ms    P Axis 64 degrees    R AXIS 21 degrees    T Axis 38 degrees    Interpretation ECG       Sinus rhythm  Normal ECG  No previous ECGs available         Transplant Surgery Consult Note    Medical record number: [id number]  YOB: 1960,   Consult requested by the patient for evaluation of kidney donation candidacy.    Assessment and Recommendations: Mr. Payne appears to be a good candidate for kidney donation at this point in the evaluation. The following issues will need to be addressed prior to formal review:    CT abdomen and pelvis with contrast to be ordered for assessment vascular anatomy: Yes  Dietician consult ordered: Yes  Social work consult ordered: Yes  CXR and EKG ordered:  Yes  Transplant donor labs ordered to include HLA, ABOx2, Cr, Iohexol GFR or Cr clearance, virology etc.      Patient would like to donate to his former boss.  The majority of our visit today was spent in counseling regarding the medical and surgical risks of kidney donation, the typical vianca-and post-operative experience and recovery/return to work pattern.  We also talked about post-op visits and longer term health care maintenance, as well as the implications of having one remaining kidney. This discussion included, but was not limited to rates of complications such as bleeding, infection, need for transfusion, reoperation, other organ injury, future bowel obstruction, incisional hernia, port site pain, varicocele, venous thrombosis, pulmonary embolism, renal failure, and death  (3 per 10,000). The patient understands that if end stage renal failure happens that dialysis or transplant would be required.  At the conclusion of the visit, all questions had been answered and Mr. Payne's candidacy for donation will be reviewed at our Multidisciplinary Donor Selection Committee. He will stay in contact with the nurse coordinator with any concerns.      We discussed long-term risks in detail.  I discussed the articles suggesting a small increase in ESKD in donors and their limitations.        We discussed recovery, including length of hospital stay; no new meds other than pain meds on discharge; limitations after surgery (no driving for a couple of weeks; no lifting over 10 lbs or exercise stretching abdominal muscles for 6 weeks; return to work [he lifts as part of his job]; and fatigue for the first few weeks postdonation.  I informed him/her of our donor survey results on donors feeling ready to drive, and on return to feeling back to normal.  We also discussed the need for maintaining a healthy lifestyle long-term after donation      We discussed the national Genm[employer] system and the possibility of participating, if not a match for the intended recipient.  I explained how the system worked.    As an aside, we have recently developed a risk calculator based on our long-term donor follow-up.  Our goal was to provide information to donors, donor candidates and the medical community. The calculator, available at the web site below (it loads slowly) provides a risk estimate over 40 years postdonation of developing diabetes, hypertension, proteinuria, reduced eGFR and ESRD.   We hope you find it useful,  (And please let us know if you have difficulties using it or have suggestions for improvement.)  [email or web address]    40 min spent on the date of the encounter in chart review, patient visit,  documentation and/or discussion with other providers about the issues  documented above.        Eric Hall MD  Surgical Professor, Kidney Transplantation                                                                                                    ---------------------------------------------------------------------------------------------------    HPI: Angel Payne is a 63 year old year old male who presents for a kidney donor evaluation.        Personal history of:   No    Yes  Cancer:    []      []  Comment:     Diabetes   []      []  Comment:    Thombosis   []      []  Comment:       Hepatitis   []      []  Comment:    Tuberculosis   []      []  Comment:   Back or neck pain:  []      []  Comment:      Kidney stones   []      []  Comment:                  Kidney infections  []      []  Comment:           Urinary retention  []      []    Comment:   Regular NSAID use:  []      []     Comment:      Constipation:   []      []        Comment:      Caodaism  []      []       Comment:      Other:    []      []       Comment:         No past medical history on file.  No past surgical history on file.  No family history on file.  Social History     Socioeconomic History    Marital status:      Spouse name: Not on file    Number of children: Not on file    Years of education: Not on file    Highest education level: Not on file   Occupational History    Not on file   Tobacco Use    Smoking status: Every Day     Packs/day: .5     Types: Cigarettes    Smokeless tobacco: Never   Substance and Sexual Activity    Alcohol use: Yes     Comment: some weekends    Drug use: Yes     Comment: THC selzers    Sexual activity: Not on file   Other Topics Concern    Not on file   Social History Narrative    Not on file     Social Determinants of Health     Financial Resource Strain: Not on file   Food Insecurity: Not on file   Transportation Needs: Not on file   Physical Activity: Not on file   Stress: Not on file   Social Connections: Not on file   Interpersonal Safety: Not on file    Housing Stability: Not on file       ROS:   CONSTITUTIONAL:  No fevers or chills  EYES: negative for icterus  ENT:  negative for hearing loss, tinnitus and sore throat  RESPIRATORY:  negative for cough, sputum, dyspnea  CARDIOVASCULAR:  negative for chest pain  GASTROINTESTINAL:  negative for nausea, vomiting, diarrhea or constipation  GENITOURINARY:  negative for incontinence, dysuria, bladder emptying problems  HEME:  No easy bruising  INTEGUMENT:  negative for rash and pruritus  NEURO:  Negative for headache, seizure disorder    Allergies:   No Known Allergies    Medications:      Exam:      Body mass index is 25.07 kg/m .  Constitutional - A&O in NAD.   Eyes - no redness or discharge.  Sclera anicteric  Respiratory - no cough, no labored breathing  Musculoskeletal - range of motion normal  Skin - no discoloration, no jaundice  Neurological - no tremors.  No facial droop or dysarthria  Psychiatric - normal mood and affect  The rest of a comprehensive physical examination is deferred due to PHE (public health emergency) video visit restrictions     Diagnostics:   Recent Results (from the past 336 hour(s))   Prostate spec antigen screen  for men over 50    Collection Time: 03/15/24  6:52 AM   Result Value Ref Range    Prostate Specific Antigen Screen 0.78 0.00 - 4.50 ng/mL   EBV Capsid Antibody IgM    Collection Time: 03/15/24  6:52 AM   Result Value Ref Range    EBV Capsid Shireen IgM Instrument Value 11.3 <36.0 U/mL    EBV Capsid Antibody IgM No detectable antibody. No detectable antibody.   EBV Capsid Antibody IgG    Collection Time: 03/15/24  6:52 AM   Result Value Ref Range    EBV Capsid Shireen IgG Instrument Value 93.9 (H) <18.0 U/mL    EBV Capsid Antibody IgG Positive (A) No detectable antibody.   CMV Antibody IgG    Collection Time: 03/15/24  6:52 AM   Result Value Ref Range    CMV Shireen IgG Instrument Value <0.20 <0.60 U/mL    CMV Antibody IgG No detectable antibody. No detectable antibody.    Treponema Abs w  Reflex to RPR and Titer    Collection Time: 03/15/24  6:52 AM   Result Value Ref Range    Treponema Antibody Total Nonreactive Nonreactive   HIV Antigen Antibody Combo Pretransplant Cascade    Collection Time: 03/15/24  6:52 AM   Result Value Ref Range    HIV Antigen Antibody Combo Pretransplant Nonreactive Nonreactive   Hepatitis C antibody    Collection Time: 03/15/24  6:52 AM   Result Value Ref Range    Hepatitis C Antibody Nonreactive Nonreactive   Hepatitis B surface antigen    Collection Time: 03/15/24  6:52 AM   Result Value Ref Range    Hepatitis B Surface Antigen Nonreactive Nonreactive   Hepatitis B Surface Antibody    Collection Time: 03/15/24  6:52 AM   Result Value Ref Range    Hepatitis B Surface Antibody Nonreactive     Hepatitis B Surface Antibody Instrument Value <3.50 <8.5 m[IU]/mL   Hepatitis B core antibody    Collection Time: 03/15/24  6:52 AM   Result Value Ref Range    Hepatitis B Core Antibody Total Nonreactive Nonreactive   Protein  random urine    Collection Time: 03/15/24  6:52 AM   Result Value Ref Range    Total Protein Urine mg/dL 9.3   mg/dL    Total Protein Urine mg/mg Creat 0.06 0.00 - 0.20 mg/mg Cr    Creatinine Urine mg/dL 150.0 mg/dL   Albumin Random Urine Quantitative with Creat Ratio    Collection Time: 03/15/24  6:52 AM   Result Value Ref Range    Creatinine Urine mg/dL 151.0 mg/dL    Albumin Urine mg/L <12.0 mg/L    Albumin Urine mg/g Cr     Routine UA with microscopic    Collection Time: 03/15/24  6:52 AM   Result Value Ref Range    Color Urine Light Yellow Colorless, Straw, Light Yellow, Yellow    Appearance Urine Clear Clear    Glucose Urine Negative Negative mg/dL    Bilirubin Urine Negative Negative    Ketones Urine Negative Negative mg/dL    Specific Gravity Urine 1.017 1.003 - 1.035    Blood Urine Negative Negative    pH Urine 6.0 5.0 - 7.0    Protein Albumin Urine Negative Negative mg/dL    Urobilinogen Urine Normal Normal, 2.0 mg/dL    Nitrite Urine Negative Negative     Leukocyte Esterase Urine Negative Negative    Mucus Urine Present (A) None Seen /LPF    RBC Urine <1 <=2 /HPF    WBC Urine <1 <=5 /HPF    Hyaline Casts Urine 2 <=2 /LPF   CBC with platelets    Collection Time: 03/15/24  6:52 AM   Result Value Ref Range    WBC Count 4.4 4.0 - 11.0 10e3/uL    RBC Count 5.24 4.40 - 5.90 10e6/uL    Hemoglobin 16.2 13.3 - 17.7 g/dL    Hematocrit 44.2 40.0 - 53.0 %    MCV 84 78 - 100 fL    MCH 30.9 26.5 - 33.0 pg    MCHC 36.7 (H) 31.5 - 36.5 g/dL    RDW 12.1 10.0 - 15.0 %    Platelet Count 219 150 - 450 10e3/uL   Partial thromboplastin time    Collection Time: 03/15/24  6:52 AM   Result Value Ref Range    aPTT 32 22 - 38 Seconds   INR    Collection Time: 03/15/24  6:52 AM   Result Value Ref Range    INR 1.00 0.85 - 1.15   Hemoglobin A1c    Collection Time: 03/15/24  6:52 AM   Result Value Ref Range    Hemoglobin A1C 5.6 <5.7 %   Phosphorus    Collection Time: 03/15/24  6:52 AM   Result Value Ref Range    Phosphorus 3.0 2.5 - 4.5 mg/dL   Uric acid    Collection Time: 03/15/24  6:52 AM   Result Value Ref Range    Uric Acid 5.2 3.4 - 7.0 mg/dL   Lipid Profile    Collection Time: 03/15/24  6:52 AM   Result Value Ref Range    Cholesterol 135 <200 mg/dL    Triglycerides 86 <150 mg/dL    Direct Measure HDL 37 (L) >=40 mg/dL    LDL Cholesterol Calculated 81 <=100 mg/dL    Non HDL Cholesterol 98 <130 mg/dL    Patient Fasting > 8hrs? Yes    Comprehensive metabolic panel    Collection Time: 03/15/24  6:52 AM   Result Value Ref Range    Sodium 136 135 - 145 mmol/L    Potassium 4.5 3.4 - 5.3 mmol/L    Carbon Dioxide (CO2) 22 22 - 29 mmol/L    Anion Gap 10 7 - 15 mmol/L    Urea Nitrogen 11.1 8.0 - 23.0 mg/dL    Creatinine 0.95 0.67 - 1.17 mg/dL    GFR Estimate >90 >60 mL/min/1.73m2    Calcium 9.2 8.8 - 10.2 mg/dL    Chloride 104 98 - 107 mmol/L    Glucose 108 (H) 70 - 99 mg/dL    Alkaline Phosphatase 81 40 - 150 U/L    AST 33 0 - 45 U/L    ALT 61 0 - 70 U/L    Protein Total 7.2 6.4 - 8.3 g/dL     Albumin 4.6 3.5 - 5.2 g/dL    Bilirubin Total 0.6 <=1.2 mg/dL   Quantiferon TB Gold Plus Grey Tube    Collection Time: 03/15/24  6:52 AM    Specimen: Arm, Right; Blood   Result Value Ref Range    Quantiferon Nil Tube 0.01 IU/mL   Quantiferon TB Gold Plus Green Tube    Collection Time: 03/15/24  6:52 AM    Specimen: Arm, Right; Blood   Result Value Ref Range    Quantiferon TB1 Tube 0.01 IU/mL   Quantiferon TB Gold Plus Yellow Tube    Collection Time: 03/15/24  6:52 AM    Specimen: Arm, Right; Blood   Result Value Ref Range    Quantiferon TB2 Tube 0.01    Quantiferon TB Gold Plus Purple Tube    Collection Time: 03/15/24  6:52 AM    Specimen: Arm, Right; Blood   Result Value Ref Range    Quantiferon Mitogen 10.00 IU/mL   Adult Type and Screen    Collection Time: 03/15/24  6:52 AM   Result Value Ref Range    ABO/RH(D) O POS     Antibody Screen Negative Negative    SPECIMEN EXPIRATION DATE [id number]    Quantiferon TB Gold Plus    Collection Time: 03/15/24  6:52 AM    Specimen: Arm, Right; Blood   Result Value Ref Range    Quantiferon-TB Gold Plus Negative Negative    TB1 Ag minus Nil Value 0.00 IU/mL    TB2 Ag minus Nil Value 0.00 IU/mL    Mitogen minus Nil Result 9.99 IU/mL    Nil Result 0.01 IU/mL   ABO and Rh 2nd type and screen required    Collection Time: 03/15/24  7:11 AM   Result Value Ref Range    ABO/RH(D) O POS     SPECIMEN EXPIRATION DATE [id number]    EKG 12-lead complete w/read - Clinics    Collection Time: 03/15/24 12:22 PM   Result Value Ref Range    Systolic Blood Pressure  mmHg    Diastolic Blood Pressure  mmHg    Ventricular Rate 61 BPM    Atrial Rate 61 BPM    FL Interval 198 ms    QRS Duration 86 ms     ms    QTc 388 ms    P Axis 64 degrees    R AXIS 21 degrees    T Axis 38 degrees    Interpretation ECG       Sinus rhythm  Normal ECG  No previous ECGs available

## 2024-03-18 LAB
STRONGYLOIDES IGG SER IA-ACNC: 0.3 IV
TRYPANOSOMA CRUZI: NORMAL

## 2024-03-19 LAB
BSA: 2.2 M2
IOHEXOL CL UR+SERPL-VRATE: 105 ML/MIN
IOHEXOL CL UR+SERPL-VRATE: 3.43 MG/DL
IOHEXOL CL UR+SERPL-VRATE: 3.43 MG/DL
IOHEXOL CL UR+SERPL-VRATE: 7.12 MG/DL
IOHEXOL CL UR+SERPL-VRATE: 82 /1.73 M2
WNV IGG SER IA-ACNC: 0.21 IV
WNV IGM SER IA-ACNC: 0.01 IV

## 2024-03-20 ENCOUNTER — COMMITTEE REVIEW (OUTPATIENT)
Dept: TRANSPLANT | Facility: CLINIC | Age: 64
End: 2024-03-20
Payer: COMMERCIAL

## 2024-03-20 NOTE — COMMITTEE REVIEW
Living Donor Committee Review Note Evaluation Date: 3/15/2024  Committee Review Date: 3/20/2024    Donor being evaluated for: Kidney    Transplant Phase: Evaluation  Transplant Status: Active    Transplant Coordinator: Araseli Blackwell  Transplant Surgeon:       Committee Review Members:  Independent Living Donor Advocate Shazia Madison, [facility], Ingrid Law   Nephrology David Talbot MD, Enrrique Young MD, Joshua Vargas MD   Nutrition Bettina Mcgowan, BITA   Pharmacist Kelsi Brower, [facility]   Transplant Veena Roseanne Rivas, RN, AMANDA Mckinnon, Araseli Blackwell, ANN MARIE, Kim Briggs, RN, Kelsi Perez, RN, Yoselyn Peres, RN   Transplant Surgery Eric Hall MD, Nita Owens MD, MD       Transplant Eligibility:     Committee Review Decision: Needs Re-presentation    Relative Contraindications:     Absolute Contraindications:     Committee Chair Eric Hall MD verbally attested to the committee's decision.    Committee Discussion Details:   Reviewed all notes and results. Will need to follow up on:    24 hour blood pressure monitor   Stress echo (strong family history of premature CAD)  Low dose CT Chest (smoking history, family history of lung cancer)  Obtain Colonoscopy records    Writer called Angel to discuss outcome of committee review and next steps. Orders placed, Angel will start with 24hr blood pressure. Angel is in agreement with plan.

## 2024-03-21 LAB
A*LOCUS SEROLOGIC EQUIVALENT: 1
A*LOCUS: NORMAL
ABTEST METHOD: NORMAL
ATRIAL RATE - MUSE: 61 BPM
B*: NORMAL
B*LOCUS SEROLOGIC EQUIVALENT: 60
B*LOCUS: NORMAL
B*SEROLOGIC EQUIVALENT: 57
BW-1: NORMAL
BW-2: NORMAL
C*: NORMAL
C*LOCUS SEROLOGIC EQUIVALENT: 10
C*LOCUS: NORMAL
C*SEROLOGIC EQUIVALENT: 6
DIASTOLIC BLOOD PRESSURE - MUSE: NORMAL MMHG
DPA1*: NORMAL
DPB1*: NORMAL
DQA1*: NORMAL
DQA1*LOCUS: NORMAL
DQB1*: NORMAL
DQB1*LOCUS SEROLOGIC EQUIVALENT: 9
DQB1*LOCUS: NORMAL
DQB1*SEROLOGIC EQUIVALENT: 6
DRB1*: NORMAL
DRB1*LOCUS SEROLOGIC EQUIVALENT: 7
DRB1*LOCUS: NORMAL
DRB1*SEROLOGIC EQUIVALENT: 13
DRB3*LOCUS SEROLOGIC EQUIVALENT: 52
DRB3*LOCUS: NORMAL
DRB4*: NORMAL
DRB4*SEROLOGIC EQUIVALENT: NORMAL
DRSSO TEST METHOD: NORMAL
INTERPRETATION ECG - MUSE: NORMAL
P AXIS - MUSE: 64 DEGREES
PR INTERVAL - MUSE: 198 MS
QRS DURATION - MUSE: 86 MS
QT - MUSE: 386 MS
QTC - MUSE: 388 MS
R AXIS - MUSE: 21 DEGREES
SYSTOLIC BLOOD PRESSURE - MUSE: NORMAL MMHG
T AXIS - MUSE: 38 DEGREES
VENTRICULAR RATE- MUSE: 61 BPM

## 2024-03-26 ENCOUNTER — TRANSFERRED RECORDS (OUTPATIENT)
Dept: HEALTH INFORMATION MANAGEMENT | Facility: CLINIC | Age: 64
End: 2024-03-26
Payer: COMMERCIAL

## 2024-04-10 ENCOUNTER — COMMITTEE REVIEW (OUTPATIENT)
Dept: TRANSPLANT | Facility: CLINIC | Age: 64
End: 2024-04-10
Payer: COMMERCIAL

## 2024-04-10 NOTE — COMMITTEE REVIEW
Living Donor Committee Review Note Evaluation Date: 3/15/2024  Committee Review Date: 4/10/2024    Donor being evaluated for: Kidney    Transplant Phase: Evaluation  Transplant Status: Active    Transplant Coordinator: Araseli Blackwell  Transplant Surgeon:        Committee Review Members:  Immunology Jhony Goff, PhD   Independent Living Donor Advocate Ingrid Law   Nephrology David Talbot MD, Oksana Verma MD, Joshua Vargas MD   Nutrition Bettina Mcgowan, RD   Pharmacist Kelsi rBower, [facility]   Transplant Veena Roseanne Rivas, RN, Emily Ruiz, MSW, Porsche Nguyen, RN, Araseli Blackwell, RN, Alicia Alvarado, NP, Isabel Mcpherson, RN, Serene Bravo, LPN, EUGENE SEGOVIA, ANN MARIE, Kim Briggs, ANN MARIE, Kelsi Perez, ANN MARIE, Yoselyn Peres, RN   Transplant Surgery Nita Owens MD, MD       Transplant Eligibility:     Committee Review Decision: Needs Re-presentation    Relative Contraindications:     Absolute Contraindications:     Committee Chair Nita Owens MD verbally attested to the committee's decision.    Committee Discussion Details:     24hr blood pressure results reviewed- will need to be put on medication for HTN. Once he has been on medication for three months we can proceed with the other needed testing.     Writer called Angel and discussed outcome of committee review and need to see PCP for HTN and medication. Angel verbalized understanding and is in agreement with plan- Angel will let us know when he starts on a medication.    49 yo F w/ symptomatic anemia 2/2 to heavy and irregular vaginal bleeding.  -Patient tachycardic and symptomatic, complains of dizziness, weakness, spots in vision. Admit for blood transfusion  -Transvaginal ultrasound  -Reg diet  -Voids  -Plan to discharge in the afternoon if stable    D/w Dr. Tineo

## 2025-03-16 ENCOUNTER — HEALTH MAINTENANCE LETTER (OUTPATIENT)
Age: 65
End: 2025-03-16